# Patient Record
Sex: FEMALE | Race: WHITE | NOT HISPANIC OR LATINO | Employment: OTHER | URBAN - METROPOLITAN AREA
[De-identification: names, ages, dates, MRNs, and addresses within clinical notes are randomized per-mention and may not be internally consistent; named-entity substitution may affect disease eponyms.]

---

## 2023-04-10 ENCOUNTER — TELEPHONE (OUTPATIENT)
Dept: PRIMARY CARE | Facility: CLINIC | Age: 68
End: 2023-04-10
Payer: MEDICARE

## 2023-04-10 NOTE — TELEPHONE ENCOUNTER
Result Communication    Resulted Orders   BI mammo bilateral screening tomosynthesis    Narrative    Interpreted By:  JULITO MOORE MD  MRN: [id number]  Patient Name: JERROD PANTOJA     STUDY:  DIGITAL MAMM SCREENING W/ JOHN;  4/5/2023 9:44 am     ACCESSION NUMBER(S):  [id number]     ORDERING CLINICIAN:  KARRI SCHAEFER     INDICATION:  Screening.     COMPARISON:  04/04/2022, 03/12/2021, 06/10/2020, 03/20/2019     FINDINGS:  2D and tomosynthesis images were reviewed at 1 mm slice thickness.     There are areas of scattered fibroglandular tissue.   No suspicious  masses or calcifications are identified.       Impression    No mammographic evidence of malignancy.     BI-RADS CATEGORY:     Category: 1 - Negative.  Recommendation: 1 Year Screening.     For any future breast imaging appointments, please call [phone]).     Patient letter sent SNORM             7:00 PM      Results were successfully communicated with the patient and they acknowledged their understanding.  Left message for patient to call for results.  Normal mammogram, recheck 1 year.

## 2023-04-13 DIAGNOSIS — I25.10 ATHEROSCLEROSIS OF NATIVE CORONARY ARTERY OF NATIVE HEART WITHOUT ANGINA PECTORIS: ICD-10-CM

## 2023-04-13 DIAGNOSIS — I10 BENIGN ESSENTIAL HYPERTENSION: Primary | ICD-10-CM

## 2023-04-13 RX ORDER — ROSUVASTATIN CALCIUM 10 MG/1
TABLET, COATED ORAL
Qty: 90 TABLET | Refills: 3 | Status: SHIPPED | OUTPATIENT
Start: 2023-04-13 | End: 2023-08-15 | Stop reason: SDUPTHER

## 2023-04-13 RX ORDER — METOPROLOL SUCCINATE 25 MG/1
TABLET, EXTENDED RELEASE ORAL
Qty: 90 TABLET | Refills: 3 | Status: SHIPPED | OUTPATIENT
Start: 2023-04-13 | End: 2023-08-14 | Stop reason: SDUPTHER

## 2023-07-10 RX ORDER — CELECOXIB 400 MG/1
CAPSULE ORAL
Qty: 90 CAPSULE | Refills: 3 | OUTPATIENT
Start: 2023-07-10

## 2023-07-13 LAB — HEMOGLOBIN A1C/HEMOGLOBIN TOTAL IN BLOOD EXTERNAL: 6.5 %

## 2023-08-14 ENCOUNTER — OFFICE VISIT (OUTPATIENT)
Dept: PRIMARY CARE | Facility: CLINIC | Age: 68
End: 2023-08-14
Payer: MEDICARE

## 2023-08-14 ENCOUNTER — LAB (OUTPATIENT)
Dept: LAB | Facility: LAB | Age: 68
End: 2023-08-14
Payer: MEDICARE

## 2023-08-14 VITALS
TEMPERATURE: 97.9 F | HEIGHT: 63 IN | SYSTOLIC BLOOD PRESSURE: 119 MMHG | WEIGHT: 146 LBS | BODY MASS INDEX: 25.87 KG/M2 | OXYGEN SATURATION: 96 % | HEART RATE: 66 BPM | DIASTOLIC BLOOD PRESSURE: 78 MMHG

## 2023-08-14 DIAGNOSIS — I10 BENIGN ESSENTIAL HYPERTENSION: Primary | ICD-10-CM

## 2023-08-14 DIAGNOSIS — E10.9 CONTROLLED DIABETES MELLITUS TYPE 1 WITHOUT COMPLICATIONS (MULTI): ICD-10-CM

## 2023-08-14 DIAGNOSIS — Z11.59 NEED FOR HEPATITIS C SCREENING TEST: ICD-10-CM

## 2023-08-14 DIAGNOSIS — E11.42 DIABETIC PERIPHERAL NEUROPATHY (MULTI): ICD-10-CM

## 2023-08-14 DIAGNOSIS — M48.02 SPINAL STENOSIS OF CERVICAL REGION: ICD-10-CM

## 2023-08-14 DIAGNOSIS — I25.10 ATHEROSCLEROSIS OF NATIVE CORONARY ARTERY OF NATIVE HEART WITHOUT ANGINA PECTORIS: ICD-10-CM

## 2023-08-14 PROBLEM — G47.00 INSOMNIA: Status: ACTIVE | Noted: 2023-08-14

## 2023-08-14 PROBLEM — L65.9 HAIR LOSS: Status: ACTIVE | Noted: 2023-08-14

## 2023-08-14 PROBLEM — R42 VERTIGO: Status: ACTIVE | Noted: 2023-08-14

## 2023-08-14 PROBLEM — H65.90 SEROUS OTITIS MEDIA: Status: ACTIVE | Noted: 2023-08-14

## 2023-08-14 PROBLEM — H90.3 BILATERAL HIGH FREQUENCY SENSORINEURAL HEARING LOSS: Status: ACTIVE | Noted: 2023-08-14

## 2023-08-14 PROBLEM — E06.3 HASHIMOTO'S THYROIDITIS: Status: ACTIVE | Noted: 2023-08-14

## 2023-08-14 PROBLEM — E55.9 VITAMIN D DEFICIENCY: Status: ACTIVE | Noted: 2023-08-14

## 2023-08-14 LAB
ALANINE AMINOTRANSFERASE (SGPT) (U/L) IN SER/PLAS: 11 U/L (ref 7–45)
ALBUMIN (G/DL) IN SER/PLAS: 4.3 G/DL (ref 3.4–5)
ALKALINE PHOSPHATASE (U/L) IN SER/PLAS: 73 U/L (ref 33–136)
ANION GAP IN SER/PLAS: 11 MMOL/L (ref 10–20)
ASPARTATE AMINOTRANSFERASE (SGOT) (U/L) IN SER/PLAS: 16 U/L (ref 9–39)
BILIRUBIN TOTAL (MG/DL) IN SER/PLAS: 0.7 MG/DL (ref 0–1.2)
CALCIUM (MG/DL) IN SER/PLAS: 9.4 MG/DL (ref 8.6–10.6)
CARBON DIOXIDE, TOTAL (MMOL/L) IN SER/PLAS: 30 MMOL/L (ref 21–32)
CHLORIDE (MMOL/L) IN SER/PLAS: 108 MMOL/L (ref 98–107)
CHOLESTEROL (MG/DL) IN SER/PLAS: 141 MG/DL (ref 0–199)
CHOLESTEROL IN HDL (MG/DL) IN SER/PLAS: 77.8 MG/DL
CHOLESTEROL/HDL RATIO: 1.8
COBALAMIN (VITAMIN B12) (PG/ML) IN SER/PLAS: 710 PG/ML (ref 211–911)
CREATININE (MG/DL) IN SER/PLAS: 0.6 MG/DL (ref 0.5–1.05)
GFR FEMALE: >90 ML/MIN/1.73M2
GLUCOSE (MG/DL) IN SER/PLAS: 111 MG/DL (ref 74–99)
HEPATITIS C VIRUS AB PRESENCE IN SERUM: NONREACTIVE
LDL: 52 MG/DL (ref 0–99)
POTASSIUM (MMOL/L) IN SER/PLAS: 4.6 MMOL/L (ref 3.5–5.3)
PROTEIN TOTAL: 6.9 G/DL (ref 6.4–8.2)
SODIUM (MMOL/L) IN SER/PLAS: 144 MMOL/L (ref 136–145)
TRIGLYCERIDE (MG/DL) IN SER/PLAS: 55 MG/DL (ref 0–149)
UREA NITROGEN (MG/DL) IN SER/PLAS: 19 MG/DL (ref 6–23)
VLDL: 11 MG/DL (ref 0–40)

## 2023-08-14 PROCEDURE — 80061 LIPID PANEL: CPT

## 2023-08-14 PROCEDURE — 86803 HEPATITIS C AB TEST: CPT

## 2023-08-14 PROCEDURE — 3044F HG A1C LEVEL LT 7.0%: CPT | Performed by: FAMILY MEDICINE

## 2023-08-14 PROCEDURE — 99214 OFFICE O/P EST MOD 30 MIN: CPT | Performed by: FAMILY MEDICINE

## 2023-08-14 PROCEDURE — 82607 VITAMIN B-12: CPT

## 2023-08-14 PROCEDURE — 80053 COMPREHEN METABOLIC PANEL: CPT

## 2023-08-14 PROCEDURE — 3074F SYST BP LT 130 MM HG: CPT | Performed by: FAMILY MEDICINE

## 2023-08-14 PROCEDURE — 1036F TOBACCO NON-USER: CPT | Performed by: FAMILY MEDICINE

## 2023-08-14 PROCEDURE — 3078F DIAST BP <80 MM HG: CPT | Performed by: FAMILY MEDICINE

## 2023-08-14 PROCEDURE — 36415 COLL VENOUS BLD VENIPUNCTURE: CPT

## 2023-08-14 PROCEDURE — 1160F RVW MEDS BY RX/DR IN RCRD: CPT | Performed by: FAMILY MEDICINE

## 2023-08-14 PROCEDURE — 1159F MED LIST DOCD IN RCRD: CPT | Performed by: FAMILY MEDICINE

## 2023-08-14 RX ORDER — ACETAMINOPHEN 500 MG
1 TABLET ORAL DAILY
COMMUNITY
Start: 2018-03-12

## 2023-08-14 RX ORDER — INSULIN LISPRO 100 [IU]/ML
INJECTION, SOLUTION INTRAVENOUS; SUBCUTANEOUS
COMMUNITY
Start: 2022-08-23

## 2023-08-14 RX ORDER — CALCIUM LACTATE 100 MG
TABLET ORAL
COMMUNITY
End: 2024-01-30 | Stop reason: WASHOUT

## 2023-08-14 RX ORDER — BLOOD SUGAR DIAGNOSTIC
STRIP MISCELLANEOUS
COMMUNITY
Start: 2021-03-08

## 2023-08-14 RX ORDER — CETIRIZINE HYDROCHLORIDE 10 MG/1
1 TABLET ORAL NIGHTLY
COMMUNITY
Start: 2016-08-25

## 2023-08-14 RX ORDER — TRIAMCINOLONE ACETONIDE 55 UG/1
SPRAY, METERED NASAL
COMMUNITY
Start: 2021-03-25

## 2023-08-14 RX ORDER — METOPROLOL SUCCINATE 25 MG/1
25 TABLET, EXTENDED RELEASE ORAL DAILY
Qty: 90 TABLET | Refills: 1 | Status: SHIPPED | OUTPATIENT
Start: 2023-08-14 | End: 2024-02-08 | Stop reason: SDUPTHER

## 2023-08-14 RX ORDER — CELECOXIB 400 MG/1
1 CAPSULE ORAL DAILY
COMMUNITY
Start: 2018-04-24 | End: 2023-08-14 | Stop reason: SDUPTHER

## 2023-08-14 RX ORDER — ASCORBIC ACID 250 MG
TABLET ORAL
COMMUNITY

## 2023-08-14 RX ORDER — LEVOTHYROXINE SODIUM 88 UG/1
88 TABLET ORAL DAILY
COMMUNITY
Start: 2023-07-26

## 2023-08-14 RX ORDER — VITS A,C,E/LUTEIN/MINERALS 300MCG-200
1 TABLET ORAL DAILY
COMMUNITY

## 2023-08-14 RX ORDER — CELECOXIB 400 MG/1
400 CAPSULE ORAL DAILY
Qty: 90 CAPSULE | Refills: 1 | Status: SHIPPED | OUTPATIENT
Start: 2023-08-14 | End: 2024-02-08 | Stop reason: SDUPTHER

## 2023-08-14 ASSESSMENT — PATIENT HEALTH QUESTIONNAIRE - PHQ9
SUM OF ALL RESPONSES TO PHQ9 QUESTIONS 1 AND 2: 0
1. LITTLE INTEREST OR PLEASURE IN DOING THINGS: NOT AT ALL
2. FEELING DOWN, DEPRESSED OR HOPELESS: NOT AT ALL

## 2023-08-14 ASSESSMENT — ENCOUNTER SYMPTOMS
PALPITATIONS: 0
SHORTNESS OF BREATH: 0
COUGH: 0
UNEXPECTED WEIGHT CHANGE: 0
ABDOMINAL PAIN: 0
NECK PAIN: 1
FATIGUE: 0

## 2023-08-14 NOTE — PROGRESS NOTES
"Subjective   Patient ID: Lesia Ascencio is a 67 y.o. female who presents for Diabetes (Follow up ).    Lesia is here to follow up on her blood pressure and cholesterol.  She is seeing endocrinology who is managing her thyroid and diabetes.  She is doing very well.  She needs a refill on her celecoxib which helps her neck a lot.        Review of Systems   Constitutional:  Negative for fatigue and unexpected weight change.   Respiratory:  Negative for cough and shortness of breath.    Cardiovascular:  Negative for chest pain and palpitations.   Gastrointestinal:  Negative for abdominal pain.   Musculoskeletal:  Positive for neck pain.       Objective     /78   Pulse 66   Temp 36.6 °C (97.9 °F)   Ht 1.6 m (5' 3\")   Wt 66.2 kg (146 lb)   SpO2 96%   BMI 25.86 kg/m²     Physical Exam  Constitutional:       General: She is not in acute distress.  Neck:      Thyroid: No thyromegaly.   Cardiovascular:      Rate and Rhythm: Normal rate and regular rhythm.      Heart sounds: No murmur heard.  Pulmonary:      Effort: No respiratory distress.      Breath sounds: Normal breath sounds.   Lymphadenopathy:      Cervical: No cervical adenopathy.   Neurological:      Mental Status: She is alert.             Assessment/Plan   Problem List Items Addressed This Visit       Coronary atherosclerosis    Relevant Medications    metoprolol succinate XL (Toprol-XL) 25 mg 24 hr tablet    Other Relevant Orders    Comprehensive Metabolic Panel    Lipid Panel    Diabetes type 1, controlled (CMS/HCC)    Relevant Orders    Vitamin B12    Diabetic peripheral neuropathy (CMS/HCC)    Spinal stenosis of cervical region    Relevant Medications    celecoxib (CeleBREX) 400 mg capsule     Other Visit Diagnoses       Benign essential hypertension    -  Primary    Relevant Medications    metoprolol succinate XL (Toprol-XL) 25 mg 24 hr tablet    Need for hepatitis C screening test        Relevant Orders    Hepatitis C Antibody                "

## 2023-08-15 DIAGNOSIS — I25.10 ATHEROSCLEROSIS OF NATIVE CORONARY ARTERY OF NATIVE HEART WITHOUT ANGINA PECTORIS: ICD-10-CM

## 2023-08-15 RX ORDER — ROSUVASTATIN CALCIUM 10 MG/1
10 TABLET, COATED ORAL DAILY
Qty: 90 TABLET | Refills: 1 | Status: SHIPPED | OUTPATIENT
Start: 2023-08-15 | End: 2024-02-08 | Stop reason: SDUPTHER

## 2023-11-30 DIAGNOSIS — U07.1 COVID-19: Primary | ICD-10-CM

## 2023-11-30 RX ORDER — NIRMATRELVIR AND RITONAVIR 300-100 MG
3 KIT ORAL 2 TIMES DAILY
Qty: 30 TABLET | Refills: 0 | Status: SHIPPED | OUTPATIENT
Start: 2023-11-30 | End: 2023-12-05

## 2023-12-18 DIAGNOSIS — M48.02 SPINAL STENOSIS OF CERVICAL REGION: ICD-10-CM

## 2023-12-18 RX ORDER — CELECOXIB 400 MG/1
400 CAPSULE ORAL DAILY
Qty: 90 CAPSULE | Refills: 3 | OUTPATIENT
Start: 2023-12-18

## 2024-01-23 LAB — HEMOGLOBIN A1C/HEMOGLOBIN TOTAL IN BLOOD EXTERNAL: 6.5 %

## 2024-01-30 ENCOUNTER — OFFICE VISIT (OUTPATIENT)
Dept: PRIMARY CARE | Facility: CLINIC | Age: 69
End: 2024-01-30
Payer: MEDICARE

## 2024-01-30 VITALS
HEART RATE: 74 BPM | HEIGHT: 63 IN | TEMPERATURE: 98 F | SYSTOLIC BLOOD PRESSURE: 153 MMHG | WEIGHT: 150 LBS | BODY MASS INDEX: 26.58 KG/M2 | OXYGEN SATURATION: 99 % | DIASTOLIC BLOOD PRESSURE: 84 MMHG

## 2024-01-30 DIAGNOSIS — E10.42 CONTROLLED TYPE 1 DIABETES MELLITUS WITH DIABETIC POLYNEUROPATHY (MULTI): ICD-10-CM

## 2024-01-30 DIAGNOSIS — N39.0 URINARY TRACT INFECTION WITHOUT HEMATURIA, SITE UNSPECIFIED: Primary | ICD-10-CM

## 2024-01-30 DIAGNOSIS — I25.10 ATHEROSCLEROSIS OF NATIVE CORONARY ARTERY OF NATIVE HEART WITHOUT ANGINA PECTORIS: ICD-10-CM

## 2024-01-30 PROBLEM — E11.42 DIABETIC PERIPHERAL NEUROPATHY (MULTI): Status: RESOLVED | Noted: 2023-08-14 | Resolved: 2024-01-30

## 2024-01-30 LAB
POC APPEARANCE, URINE: CLEAR
POC BILIRUBIN, URINE: NEGATIVE
POC BLOOD, URINE: NEGATIVE
POC COLOR, URINE: YELLOW
POC GLUCOSE, URINE: NEGATIVE MG/DL
POC KETONES, URINE: NEGATIVE MG/DL
POC LEUKOCYTES, URINE: ABNORMAL
POC NITRITE,URINE: NEGATIVE
POC PH, URINE: 7 PH
POC PROTEIN, URINE: NEGATIVE MG/DL
POC SPECIFIC GRAVITY, URINE: 1.02
POC UROBILINOGEN, URINE: 1 EU/DL

## 2024-01-30 PROCEDURE — 87086 URINE CULTURE/COLONY COUNT: CPT

## 2024-01-30 PROCEDURE — 81003 URINALYSIS AUTO W/O SCOPE: CPT | Performed by: FAMILY MEDICINE

## 2024-01-30 PROCEDURE — 3077F SYST BP >= 140 MM HG: CPT | Performed by: FAMILY MEDICINE

## 2024-01-30 PROCEDURE — 1160F RVW MEDS BY RX/DR IN RCRD: CPT | Performed by: FAMILY MEDICINE

## 2024-01-30 PROCEDURE — 3079F DIAST BP 80-89 MM HG: CPT | Performed by: FAMILY MEDICINE

## 2024-01-30 PROCEDURE — 1159F MED LIST DOCD IN RCRD: CPT | Performed by: FAMILY MEDICINE

## 2024-01-30 PROCEDURE — 1036F TOBACCO NON-USER: CPT | Performed by: FAMILY MEDICINE

## 2024-01-30 PROCEDURE — 99214 OFFICE O/P EST MOD 30 MIN: CPT | Performed by: FAMILY MEDICINE

## 2024-01-30 PROCEDURE — 87186 SC STD MICRODIL/AGAR DIL: CPT

## 2024-01-30 RX ORDER — CIPROFLOXACIN 250 MG/1
250 TABLET, FILM COATED ORAL 2 TIMES DAILY
Qty: 10 TABLET | Refills: 0 | Status: SHIPPED | OUTPATIENT
Start: 2024-01-30 | End: 2024-02-08 | Stop reason: ALTCHOICE

## 2024-01-30 ASSESSMENT — ENCOUNTER SYMPTOMS
FATIGUE: 0
SHORTNESS OF BREATH: 0
ABDOMINAL PAIN: 0
NECK PAIN: 1
COUGH: 0
PALPITATIONS: 0
UNEXPECTED WEIGHT CHANGE: 0

## 2024-01-30 ASSESSMENT — PATIENT HEALTH QUESTIONNAIRE - PHQ9
2. FEELING DOWN, DEPRESSED OR HOPELESS: NOT AT ALL
1. LITTLE INTEREST OR PLEASURE IN DOING THINGS: NOT AT ALL
SUM OF ALL RESPONSES TO PHQ9 QUESTIONS 1 AND 2: 0

## 2024-01-30 ASSESSMENT — COLUMBIA-SUICIDE SEVERITY RATING SCALE - C-SSRS
1. IN THE PAST MONTH, HAVE YOU WISHED YOU WERE DEAD OR WISHED YOU COULD GO TO SLEEP AND NOT WAKE UP?: NO
6. HAVE YOU EVER DONE ANYTHING, STARTED TO DO ANYTHING, OR PREPARED TO DO ANYTHING TO END YOUR LIFE?: NO
2. HAVE YOU ACTUALLY HAD ANY THOUGHTS OF KILLING YOURSELF?: NO

## 2024-01-30 NOTE — PROGRESS NOTES
"Subjective   Patient ID: Lesia Ascencio is a 68 y.o. female who presents for UTI (Pt is here for UTI).    Lesia is here because she woke up 2 days ago with burning with urination.  No frequency or nocturia.  She does have lower abdominal pressure.  No fever.          Review of Systems   Constitutional:  Negative for fatigue and unexpected weight change.   Respiratory:  Negative for cough and shortness of breath.    Cardiovascular:  Negative for chest pain and palpitations.   Gastrointestinal:  Negative for abdominal pain.   Musculoskeletal:  Positive for neck pain.       Objective     /84   Pulse 74   Temp 36.7 °C (98 °F)   Ht 1.6 m (5' 3\")   Wt 68 kg (150 lb)   SpO2 99%   BMI 26.57 kg/m²     Physical Exam  Constitutional:       General: She is not in acute distress.  Neck:      Thyroid: No thyromegaly.   Cardiovascular:      Rate and Rhythm: Normal rate and regular rhythm.      Heart sounds: No murmur heard.  Pulmonary:      Effort: No respiratory distress.      Breath sounds: Normal breath sounds.   Lymphadenopathy:      Cervical: No cervical adenopathy.   Neurological:      Mental Status: She is alert.             Assessment/Plan   Problem List Items Addressed This Visit       Coronary atherosclerosis    Relevant Orders    Lipid Panel    Diabetes type 1, controlled (CMS/[facility])    Current Assessment & Plan     Plan per endocrinology.          Other Visit Diagnoses       Urinary tract infection without hematuria, site unspecified    -  Primary    Relevant Medications    ciprofloxacin (Cipro) 250 mg tablet    Other Relevant Orders    POCT UA Automated manually resulted (Completed)    Urine Culture                "

## 2024-02-02 LAB — BACTERIA UR CULT: ABNORMAL

## 2024-02-08 ENCOUNTER — LAB (OUTPATIENT)
Dept: LAB | Facility: LAB | Age: 69
End: 2024-02-08
Payer: MEDICARE

## 2024-02-08 ENCOUNTER — OFFICE VISIT (OUTPATIENT)
Dept: PRIMARY CARE | Facility: CLINIC | Age: 69
End: 2024-02-08
Payer: MEDICARE

## 2024-02-08 VITALS
SYSTOLIC BLOOD PRESSURE: 105 MMHG | HEIGHT: 63 IN | WEIGHT: 149 LBS | HEART RATE: 65 BPM | DIASTOLIC BLOOD PRESSURE: 77 MMHG | BODY MASS INDEX: 26.4 KG/M2 | OXYGEN SATURATION: 99 % | TEMPERATURE: 97.2 F

## 2024-02-08 DIAGNOSIS — E55.9 VITAMIN D DEFICIENCY: ICD-10-CM

## 2024-02-08 DIAGNOSIS — I25.10 ATHEROSCLEROSIS OF NATIVE CORONARY ARTERY OF NATIVE HEART WITHOUT ANGINA PECTORIS: ICD-10-CM

## 2024-02-08 DIAGNOSIS — I10 BENIGN ESSENTIAL HYPERTENSION: ICD-10-CM

## 2024-02-08 DIAGNOSIS — M48.02 SPINAL STENOSIS OF CERVICAL REGION: ICD-10-CM

## 2024-02-08 DIAGNOSIS — Z00.00 ROUTINE GENERAL MEDICAL EXAMINATION AT HEALTH CARE FACILITY: Primary | ICD-10-CM

## 2024-02-08 DIAGNOSIS — Z78.0 ASYMPTOMATIC MENOPAUSAL STATE: ICD-10-CM

## 2024-02-08 DIAGNOSIS — E10.42 CONTROLLED TYPE 1 DIABETES MELLITUS WITH DIABETIC POLYNEUROPATHY (MULTI): ICD-10-CM

## 2024-02-08 DIAGNOSIS — Z12.31 ENCOUNTER FOR SCREENING MAMMOGRAM FOR BREAST CANCER: ICD-10-CM

## 2024-02-08 LAB
25(OH)D3 SERPL-MCNC: 56 NG/ML (ref 30–100)
ALBUMIN SERPL BCP-MCNC: 3.9 G/DL (ref 3.4–5)
ALP SERPL-CCNC: 59 U/L (ref 33–136)
ALT SERPL W P-5'-P-CCNC: 11 U/L (ref 7–45)
ANION GAP SERPL CALC-SCNC: 11 MMOL/L (ref 10–20)
AST SERPL W P-5'-P-CCNC: 16 U/L (ref 9–39)
BILIRUB SERPL-MCNC: 0.6 MG/DL (ref 0–1.2)
BUN SERPL-MCNC: 25 MG/DL (ref 6–23)
CALCIUM SERPL-MCNC: 9.3 MG/DL (ref 8.6–10.6)
CHLORIDE SERPL-SCNC: 106 MMOL/L (ref 98–107)
CHOLEST SERPL-MCNC: 139 MG/DL (ref 0–199)
CHOLESTEROL/HDL RATIO: 1.7
CO2 SERPL-SCNC: 30 MMOL/L (ref 21–32)
CREAT SERPL-MCNC: 0.65 MG/DL (ref 0.5–1.05)
EGFRCR SERPLBLD CKD-EPI 2021: >90 ML/MIN/1.73M*2
GLUCOSE SERPL-MCNC: 122 MG/DL (ref 74–99)
HDLC SERPL-MCNC: 79.7 MG/DL
LDLC SERPL CALC-MCNC: 50 MG/DL
NON HDL CHOLESTEROL: 59 MG/DL (ref 0–149)
POTASSIUM SERPL-SCNC: 4.8 MMOL/L (ref 3.5–5.3)
PROT SERPL-MCNC: 6.6 G/DL (ref 6.4–8.2)
SODIUM SERPL-SCNC: 142 MMOL/L (ref 136–145)
TRIGL SERPL-MCNC: 49 MG/DL (ref 0–149)
VLDL: 10 MG/DL (ref 0–40)

## 2024-02-08 PROCEDURE — 3078F DIAST BP <80 MM HG: CPT | Performed by: FAMILY MEDICINE

## 2024-02-08 PROCEDURE — 80061 LIPID PANEL: CPT

## 2024-02-08 PROCEDURE — 80053 COMPREHEN METABOLIC PANEL: CPT

## 2024-02-08 PROCEDURE — 36415 COLL VENOUS BLD VENIPUNCTURE: CPT

## 2024-02-08 PROCEDURE — 1123F ACP DISCUSS/DSCN MKR DOCD: CPT | Performed by: FAMILY MEDICINE

## 2024-02-08 PROCEDURE — 82306 VITAMIN D 25 HYDROXY: CPT

## 2024-02-08 PROCEDURE — 1158F ADVNC CARE PLAN TLK DOCD: CPT | Performed by: FAMILY MEDICINE

## 2024-02-08 PROCEDURE — 1170F FXNL STATUS ASSESSED: CPT | Performed by: FAMILY MEDICINE

## 2024-02-08 PROCEDURE — 99214 OFFICE O/P EST MOD 30 MIN: CPT | Performed by: FAMILY MEDICINE

## 2024-02-08 PROCEDURE — 1036F TOBACCO NON-USER: CPT | Performed by: FAMILY MEDICINE

## 2024-02-08 PROCEDURE — 3074F SYST BP LT 130 MM HG: CPT | Performed by: FAMILY MEDICINE

## 2024-02-08 PROCEDURE — 1159F MED LIST DOCD IN RCRD: CPT | Performed by: FAMILY MEDICINE

## 2024-02-08 PROCEDURE — 1160F RVW MEDS BY RX/DR IN RCRD: CPT | Performed by: FAMILY MEDICINE

## 2024-02-08 PROCEDURE — G0439 PPPS, SUBSEQ VISIT: HCPCS | Performed by: FAMILY MEDICINE

## 2024-02-08 RX ORDER — ROSUVASTATIN CALCIUM 10 MG/1
10 TABLET, COATED ORAL DAILY
Qty: 90 TABLET | Refills: 1 | Status: SHIPPED | OUTPATIENT
Start: 2024-02-08 | End: 2024-08-06

## 2024-02-08 RX ORDER — METOPROLOL SUCCINATE 25 MG/1
25 TABLET, EXTENDED RELEASE ORAL DAILY
Qty: 90 TABLET | Refills: 1 | Status: SHIPPED | OUTPATIENT
Start: 2024-02-08 | End: 2024-08-06

## 2024-02-08 RX ORDER — CELECOXIB 400 MG/1
400 CAPSULE ORAL DAILY
Qty: 90 CAPSULE | Refills: 1 | Status: SHIPPED | OUTPATIENT
Start: 2024-02-08 | End: 2024-08-06

## 2024-02-08 ASSESSMENT — ACTIVITIES OF DAILY LIVING (ADL)
DRESSING: INDEPENDENT
TAKING_MEDICATION: INDEPENDENT
BATHING: INDEPENDENT
DOING_HOUSEWORK: INDEPENDENT
MANAGING_FINANCES: INDEPENDENT
GROCERY_SHOPPING: INDEPENDENT

## 2024-02-08 ASSESSMENT — ENCOUNTER SYMPTOMS
LOSS OF SENSATION IN FEET: 0
ARTHRALGIAS: 0
DEPRESSION: 0
PALPITATIONS: 0
DIARRHEA: 0
NAUSEA: 0
CONSTIPATION: 0
SORE THROAT: 0
HEADACHES: 0
OCCASIONAL FEELINGS OF UNSTEADINESS: 0
NUMBNESS: 0
DIZZINESS: 0
VOMITING: 0
WEAKNESS: 0
NERVOUS/ANXIOUS: 0
FREQUENCY: 0
SLEEP DISTURBANCE: 0
ABDOMINAL PAIN: 0
UNEXPECTED WEIGHT CHANGE: 0
EYE PAIN: 0
RHINORRHEA: 0
DYSURIA: 0
BACK PAIN: 0
BLOOD IN STOOL: 0
MYALGIAS: 0
FATIGUE: 0
SHORTNESS OF BREATH: 0
DYSPHORIC MOOD: 0
COUGH: 0

## 2024-02-08 ASSESSMENT — LIFESTYLE VARIABLES
SKIP TO QUESTIONS 9-10: 1
AUDIT-C TOTAL SCORE: 0
HOW OFTEN DO YOU HAVE SIX OR MORE DRINKS ON ONE OCCASION: NEVER
HOW OFTEN DO YOU HAVE A DRINK CONTAINING ALCOHOL: NEVER
HOW MANY STANDARD DRINKS CONTAINING ALCOHOL DO YOU HAVE ON A TYPICAL DAY: PATIENT DOES NOT DRINK

## 2024-02-08 ASSESSMENT — COLUMBIA-SUICIDE SEVERITY RATING SCALE - C-SSRS
2. HAVE YOU ACTUALLY HAD ANY THOUGHTS OF KILLING YOURSELF?: NO
1. IN THE PAST MONTH, HAVE YOU WISHED YOU WERE DEAD OR WISHED YOU COULD GO TO SLEEP AND NOT WAKE UP?: NO
6. HAVE YOU EVER DONE ANYTHING, STARTED TO DO ANYTHING, OR PREPARED TO DO ANYTHING TO END YOUR LIFE?: NO

## 2024-02-08 ASSESSMENT — PATIENT HEALTH QUESTIONNAIRE - PHQ9
1. LITTLE INTEREST OR PLEASURE IN DOING THINGS: NOT AT ALL
2. FEELING DOWN, DEPRESSED OR HOPELESS: NOT AT ALL
SUM OF ALL RESPONSES TO PHQ9 QUESTIONS 1 & 2: 0

## 2024-02-08 NOTE — PROGRESS NOTES
Subjective   Reason for Visit: Lesia Ascencio is an 68 y.o. female here for a Medicare Wellness visit.     Past Medical, Surgical, and Family History reviewed and updated in chart.    Reviewed all medications by prescribing practitioner or clinical pharmacist (such as prescriptions, OTCs, herbal therapies and supplements) and documented in the medical record.    Lesia is here for her Medicare Wellness visit.    Diet:  Eating healthy  Exercise:  Walking, cycling  Sleep:  Good  Stress:  Low  Smoking:  Never  EtOH:  None    Profession:  Retired     Dentist:  Sees regularly  Eye doctor:  Sees regularly    Last Pap:  N/A  History of abnormal Pap:  No  Mammogram:  2023  Colorectal cancer screening:  Colonoscopy 2021, Dr. Miner, repeat 5 years  DEXA scan:  2020  Vaccines due?  UTD    Sexually active:  No    Menopause symptoms:  Has vaginal dryness, no hot flushes or night sweats  Urinary leakage:  Occasional        Patient Care Team:  Caitlin Jay MD as PCP - General (Family Medicine)  Caitlin Jay MD as PCP - [employer] PCP  Ari Gray MD as Referring Physician  Osbaldo Romeo MD as Referring Physician (Endocrinology)     Review of Systems   Constitutional:  Negative for fatigue and unexpected weight change.   HENT:  Negative for congestion, ear pain, rhinorrhea and sore throat.    Eyes:  Negative for pain and visual disturbance.   Respiratory:  Negative for cough and shortness of breath.    Cardiovascular:  Negative for chest pain and palpitations.   Gastrointestinal:  Negative for abdominal pain, blood in stool, constipation, diarrhea, nausea and vomiting.   Genitourinary:  Negative for dysuria, frequency, vaginal bleeding and vaginal discharge.   Musculoskeletal:  Negative for arthralgias, back pain and myalgias.   Skin:  Negative for rash.   Neurological:  Negative for dizziness, weakness, numbness and headaches.   Psychiatric/Behavioral:   "Negative for dysphoric mood and sleep disturbance. The patient is not nervous/anxious.        Objective   Vitals:  /77 Comment: home reading  Pulse 65   Temp 36.2 °C (97.2 °F)   Ht 1.6 m (5' 3\")   Wt 67.6 kg (149 lb)   SpO2 99%   BMI 26.39 kg/m²       Physical Exam  Constitutional:       General: She is not in acute distress.  HENT:      Right Ear: Tympanic membrane normal.      Left Ear: Tympanic membrane normal.   Neck:      Thyroid: No thyromegaly.   Cardiovascular:      Rate and Rhythm: Normal rate and regular rhythm.      Heart sounds: No murmur heard.  Pulmonary:      Effort: No respiratory distress.      Breath sounds: Normal breath sounds.   Chest:   Breasts:     Breasts are symmetrical.      Right: No mass, nipple discharge, skin change or tenderness.      Left: No mass, nipple discharge, skin change or tenderness.   Abdominal:      General: Bowel sounds are normal. There is no distension.      Palpations: Abdomen is soft. There is no hepatomegaly or splenomegaly.      Tenderness: There is no abdominal tenderness.   Musculoskeletal:         General: No swelling or tenderness.   Lymphadenopathy:      Cervical: No cervical adenopathy.      Upper Body:      Right upper body: No axillary adenopathy.      Left upper body: No axillary adenopathy.   Skin:     General: Skin is warm and dry.      Coloration: Skin is not jaundiced.      Findings: No rash.   Neurological:      General: No focal deficit present.      Mental Status: She is alert and oriented to person, place, and time.   Psychiatric:         Mood and Affect: Mood normal.         Behavior: Behavior normal.         Assessment/Plan   Problem List Items Addressed This Visit       Coronary atherosclerosis    Relevant Medications    metoprolol succinate XL (Toprol-XL) 25 mg 24 hr tablet    Other Relevant Orders    Comprehensive Metabolic Panel    Diabetes type 1, controlled (CMS/HCC)    Vitamin D deficiency    Relevant Orders    Vitamin D " 25-Hydroxy,Total (for eval of Vitamin D levels)     Other Visit Diagnoses       Routine general medical examination at health care facility    -  Primary    Asymptomatic menopausal state        Relevant Orders    XR DEXA bone density    Encounter for screening mammogram for breast cancer        Relevant Orders    BI mammo bilateral screening tomosynthesis    Benign essential hypertension        Relevant Medications    metoprolol succinate XL (Toprol-XL) 25 mg 24 hr tablet

## 2024-02-08 NOTE — PATIENT INSTRUCTIONS
"Thank you for coming in to see me today, and congratulations on paying attention to staying healthy!    The single most important factor in staying healthy is eating a healthy diet.  Research has shown that the healthiest diet for humans is one rich in whole fresh plant foods.  This means most of what you eat should be fresh fruits and vegetables, whole grains, beans, nuts and seeds.  Adding meat, dairy foods and eggs does not make your food healthier (although it may make it taste better!) so you should work to minimize the amount of beef, chicken, pork, turkey, lamb, cheese and eggs you eat.  Fatty fish like salmon and tuna may be healthier alternatives.  If you would like more information please check out the website \"[employer],\" and the books \"The Blue Zones\" by Terrence Salinas and \"Engine 2 Diet\" by Cole Dorsey.    I don't like recommending \"exercise\" because that has unpleasant connotations of pain and being out of breath for many people.  Instead, I encourage my patients to find a way to move your body that you LOVE and would want to do even if it were bad for you!  Playing a fun sport like pickleball, doing yoga or juan ramon chi, studying martial arts, learning to dance, even chasing your kids or grandkids around the backyard are great examples of activity that makes your heart healthier.  Remember, if you don't like it, don't do it!  There are plenty of fun options, pick one and try it out!  Aim for at least 30 minutes of activity that gets your heart revved up, most days per week.    We discussed some screening tests for you today, these tests are meant to find problems before they make you sick, when they are easier to treat and less likely to impact your health.  Depending on your age and stage of life they may include blood tests, a Pap test, a mammogram, a bone density test, a colonoscopy and others.  If you have questions later about these or other recommended tests please call and ask!    There are " a number of other things you can do to improve your health.  These may include things like   Increasing the amount of water you drink every day (aim for 1 oz of water for every 2 pounds of body weight, up to about 100 oz total)  Getting 7-8 hours of sleep every night  Avoiding smoking, drinking alcohol, and using recreational drugs    Stress management is also a very important component of staying healthy.  One of the most effective stress management tools is meditation.  I recommend everyone consider proper training in meditation - it isn't just sitting with your eyes closed and breathing.  You can find a training program in your area at "[employer].org or [employer].org.    An annual physical is a great measure to keep you as healthy as you possibly can be.  Good for you for getting that done!  See you next year :-)

## 2024-04-25 ENCOUNTER — HOSPITAL ENCOUNTER (OUTPATIENT)
Dept: RADIOLOGY | Facility: CLINIC | Age: 69
Discharge: HOME | End: 2024-04-25
Payer: MEDICARE

## 2024-04-25 VITALS — HEIGHT: 62 IN | WEIGHT: 145 LBS | BODY MASS INDEX: 26.68 KG/M2

## 2024-04-25 DIAGNOSIS — Z12.31 ENCOUNTER FOR SCREENING MAMMOGRAM FOR BREAST CANCER: ICD-10-CM

## 2024-04-25 DIAGNOSIS — Z78.0 ASYMPTOMATIC MENOPAUSAL STATE: ICD-10-CM

## 2024-04-25 PROCEDURE — 77063 BREAST TOMOSYNTHESIS BI: CPT | Performed by: STUDENT IN AN ORGANIZED HEALTH CARE EDUCATION/TRAINING PROGRAM

## 2024-04-25 PROCEDURE — 77067 SCR MAMMO BI INCL CAD: CPT

## 2024-04-25 PROCEDURE — 77067 SCR MAMMO BI INCL CAD: CPT | Performed by: STUDENT IN AN ORGANIZED HEALTH CARE EDUCATION/TRAINING PROGRAM

## 2024-04-25 PROCEDURE — 77080 DXA BONE DENSITY AXIAL: CPT

## 2024-06-17 LAB — HEMOGLOBIN A1C/HEMOGLOBIN TOTAL IN BLOOD EXTERNAL: 6.4 %

## 2024-07-16 DIAGNOSIS — M48.02 SPINAL STENOSIS OF CERVICAL REGION: ICD-10-CM

## 2024-07-16 RX ORDER — CELECOXIB 400 MG/1
400 CAPSULE ORAL DAILY
Qty: 90 CAPSULE | Refills: 1 | Status: SHIPPED | OUTPATIENT
Start: 2024-07-16

## 2024-08-01 ENCOUNTER — APPOINTMENT (OUTPATIENT)
Dept: PRIMARY CARE | Facility: CLINIC | Age: 69
End: 2024-08-01
Payer: MEDICARE

## 2024-08-09 DIAGNOSIS — I10 BENIGN ESSENTIAL HYPERTENSION: ICD-10-CM

## 2024-08-09 DIAGNOSIS — I25.10 ATHEROSCLEROSIS OF NATIVE CORONARY ARTERY OF NATIVE HEART WITHOUT ANGINA PECTORIS: ICD-10-CM

## 2024-08-11 RX ORDER — METOPROLOL SUCCINATE 25 MG/1
25 TABLET, EXTENDED RELEASE ORAL DAILY
Qty: 90 TABLET | Refills: 3 | OUTPATIENT
Start: 2024-08-11

## 2024-08-11 RX ORDER — BLOOD SUGAR DIAGNOSTIC
STRIP MISCELLANEOUS
Qty: 300 STRIP | Refills: 3 | OUTPATIENT
Start: 2024-08-11

## 2024-08-11 RX ORDER — ROSUVASTATIN CALCIUM 10 MG/1
10 TABLET, COATED ORAL DAILY
Qty: 90 TABLET | Refills: 3 | OUTPATIENT
Start: 2024-08-11

## 2024-08-13 ENCOUNTER — LAB (OUTPATIENT)
Dept: LAB | Facility: LAB | Age: 69
End: 2024-08-13
Payer: MEDICARE

## 2024-08-13 ENCOUNTER — APPOINTMENT (OUTPATIENT)
Dept: PRIMARY CARE | Facility: CLINIC | Age: 69
End: 2024-08-13
Payer: MEDICARE

## 2024-08-13 VITALS
HEART RATE: 66 BPM | DIASTOLIC BLOOD PRESSURE: 75 MMHG | WEIGHT: 146.4 LBS | SYSTOLIC BLOOD PRESSURE: 169 MMHG | OXYGEN SATURATION: 98 % | HEIGHT: 62 IN | TEMPERATURE: 97.6 F | BODY MASS INDEX: 26.94 KG/M2

## 2024-08-13 DIAGNOSIS — I25.10 ATHEROSCLEROSIS OF NATIVE CORONARY ARTERY OF NATIVE HEART WITHOUT ANGINA PECTORIS: ICD-10-CM

## 2024-08-13 DIAGNOSIS — I10 BENIGN ESSENTIAL HYPERTENSION: ICD-10-CM

## 2024-08-13 DIAGNOSIS — M54.50 ACUTE LEFT-SIDED LOW BACK PAIN WITHOUT SCIATICA: Primary | ICD-10-CM

## 2024-08-13 LAB
ALBUMIN SERPL BCP-MCNC: 4.3 G/DL (ref 3.4–5)
ALP SERPL-CCNC: 80 U/L (ref 33–136)
ALT SERPL W P-5'-P-CCNC: 11 U/L (ref 7–45)
ANION GAP SERPL CALC-SCNC: 13 MMOL/L (ref 10–20)
AST SERPL W P-5'-P-CCNC: 16 U/L (ref 9–39)
BILIRUB SERPL-MCNC: 0.6 MG/DL (ref 0–1.2)
BUN SERPL-MCNC: 20 MG/DL (ref 6–23)
CALCIUM SERPL-MCNC: 9.3 MG/DL (ref 8.6–10.6)
CHLORIDE SERPL-SCNC: 106 MMOL/L (ref 98–107)
CHOLEST SERPL-MCNC: 141 MG/DL (ref 0–199)
CHOLESTEROL/HDL RATIO: 1.9
CO2 SERPL-SCNC: 29 MMOL/L (ref 21–32)
CREAT SERPL-MCNC: 0.62 MG/DL (ref 0.5–1.05)
EGFRCR SERPLBLD CKD-EPI 2021: >90 ML/MIN/1.73M*2
GLUCOSE SERPL-MCNC: 120 MG/DL (ref 74–99)
HDLC SERPL-MCNC: 73.8 MG/DL
LDLC SERPL CALC-MCNC: 56 MG/DL
NON HDL CHOLESTEROL: 67 MG/DL (ref 0–149)
POTASSIUM SERPL-SCNC: 5.1 MMOL/L (ref 3.5–5.3)
PROT SERPL-MCNC: 6.7 G/DL (ref 6.4–8.2)
SODIUM SERPL-SCNC: 143 MMOL/L (ref 136–145)
TRIGL SERPL-MCNC: 58 MG/DL (ref 0–149)
VLDL: 12 MG/DL (ref 0–40)

## 2024-08-13 PROCEDURE — 3044F HG A1C LEVEL LT 7.0%: CPT | Performed by: FAMILY MEDICINE

## 2024-08-13 PROCEDURE — 83695 ASSAY OF LIPOPROTEIN(A): CPT

## 2024-08-13 PROCEDURE — 1159F MED LIST DOCD IN RCRD: CPT | Performed by: FAMILY MEDICINE

## 2024-08-13 PROCEDURE — 3078F DIAST BP <80 MM HG: CPT | Performed by: FAMILY MEDICINE

## 2024-08-13 PROCEDURE — 3008F BODY MASS INDEX DOCD: CPT | Performed by: FAMILY MEDICINE

## 2024-08-13 PROCEDURE — 99214 OFFICE O/P EST MOD 30 MIN: CPT | Performed by: FAMILY MEDICINE

## 2024-08-13 PROCEDURE — 36415 COLL VENOUS BLD VENIPUNCTURE: CPT

## 2024-08-13 PROCEDURE — 3077F SYST BP >= 140 MM HG: CPT | Performed by: FAMILY MEDICINE

## 2024-08-13 PROCEDURE — 1036F TOBACCO NON-USER: CPT | Performed by: FAMILY MEDICINE

## 2024-08-13 PROCEDURE — G2211 COMPLEX E/M VISIT ADD ON: HCPCS | Performed by: FAMILY MEDICINE

## 2024-08-13 PROCEDURE — 80053 COMPREHEN METABOLIC PANEL: CPT

## 2024-08-13 PROCEDURE — 1160F RVW MEDS BY RX/DR IN RCRD: CPT | Performed by: FAMILY MEDICINE

## 2024-08-13 PROCEDURE — 80061 LIPID PANEL: CPT

## 2024-08-13 PROCEDURE — 1158F ADVNC CARE PLAN TLK DOCD: CPT | Performed by: FAMILY MEDICINE

## 2024-08-13 PROCEDURE — 1123F ACP DISCUSS/DSCN MKR DOCD: CPT | Performed by: FAMILY MEDICINE

## 2024-08-13 PROCEDURE — 3048F LDL-C <100 MG/DL: CPT | Performed by: FAMILY MEDICINE

## 2024-08-13 RX ORDER — CYCLOBENZAPRINE HCL 5 MG
5 TABLET ORAL 3 TIMES DAILY PRN
Qty: 60 TABLET | Refills: 1 | Status: SHIPPED | OUTPATIENT
Start: 2024-08-13 | End: 2024-08-13 | Stop reason: SDUPTHER

## 2024-08-13 RX ORDER — CYCLOBENZAPRINE HCL 5 MG
5 TABLET ORAL 3 TIMES DAILY PRN
Qty: 60 TABLET | Refills: 1 | Status: SHIPPED | OUTPATIENT
Start: 2024-08-13

## 2024-08-13 RX ORDER — METOPROLOL SUCCINATE 25 MG/1
25 TABLET, EXTENDED RELEASE ORAL DAILY
Qty: 90 TABLET | Refills: 1 | Status: SHIPPED | OUTPATIENT
Start: 2024-08-13 | End: 2025-02-09

## 2024-08-13 ASSESSMENT — ENCOUNTER SYMPTOMS
FATIGUE: 0
BACK PAIN: 1
COUGH: 0
NAUSEA: 0
WEAKNESS: 0
UNEXPECTED WEIGHT CHANGE: 0
PALPITATIONS: 0
NUMBNESS: 0
ABDOMINAL PAIN: 0
NECK PAIN: 1
SHORTNESS OF BREATH: 0

## 2024-08-13 ASSESSMENT — PATIENT HEALTH QUESTIONNAIRE - PHQ9
1. LITTLE INTEREST OR PLEASURE IN DOING THINGS: NOT AT ALL
SUM OF ALL RESPONSES TO PHQ9 QUESTIONS 1 & 2: 0
2. FEELING DOWN, DEPRESSED OR HOPELESS: NOT AT ALL

## 2024-08-13 NOTE — ASSESSMENT & PLAN NOTE
She has musculoskeletal low back pain without sciatica.  Differential includes facet arthropathy and degenerative disc disease.  Will refer to PT and use a mild muscle relaxer, warned re sedation.  She should continue her celecoxib, and can use acetaminophen 500 mg, 2 tabs up to 3 times daily for pain.  F/U after 6 weeks of PT for review.

## 2024-08-13 NOTE — PROGRESS NOTES
"Subjective   Patient ID: Lesia Ascencio is a 68 y.o. female who presents for Follow-up (BP).    Lesia is here to follow up on her blood pressure and cholesterol.  She is seeing endocrinology who is managing her thyroid and diabetes.  She is doing very well.  She needs a refill on her celecoxib which helps her neck a lot.  She has not been monitoring her blood pressure at home.    She has been having trouble with low back pain.  She has pain in her left low back.  The celecoxib helps.  She tried salonpas patches which helped a little.  The pain keeps her from sleeping.  The pain started about 3 weeks ago and is worse over the last week.  The pain doesn't radiate.  Some dysuria a few days ago.  No fever.  No nausea or vomiting.  No numbness or weakness but she is afraid she will fall.  Walking helps some.  Sitting hurts, standing is better.  Worse with prolonged static posture.          Review of Systems   Constitutional:  Negative for fatigue and unexpected weight change.   Respiratory:  Negative for cough and shortness of breath.    Cardiovascular:  Negative for chest pain and palpitations.   Gastrointestinal:  Negative for abdominal pain and nausea.   Musculoskeletal:  Positive for back pain and neck pain.   Neurological:  Negative for weakness and numbness.       Objective     /75   Pulse 66   Temp 36.4 °C (97.6 °F)   Ht 1.575 m (5' 2\")   Wt 66.4 kg (146 lb 6.4 oz)   SpO2 98%   BMI 26.78 kg/m²     Physical Exam  Constitutional:       General: She is not in acute distress.  Neck:      Thyroid: No thyromegaly.   Cardiovascular:      Rate and Rhythm: Normal rate and regular rhythm.      Heart sounds: No murmur heard.  Pulmonary:      Effort: No respiratory distress.      Breath sounds: Normal breath sounds.   Musculoskeletal:      Comments: Lumbar spine is nontender to palpation or percussion.  No CVA tenderness.  Moderate spasm.  There is pain with both flexion and extension, flexion is worse.  SLR " negative.     Lymphadenopathy:      Cervical: No cervical adenopathy.   Neurological:      Mental Status: She is alert.      Gait: Gait is intact.      Deep Tendon Reflexes: Reflexes are normal and symmetric.             Assessment/Plan   Problem List Items Addressed This Visit       Coronary atherosclerosis    Relevant Medications    metoprolol succinate XL (Toprol-XL) 25 mg 24 hr tablet    Other Relevant Orders    Apolipoprotein B    Comprehensive Metabolic Panel    Lipid Panel    Acute left-sided low back pain without sciatica - Primary    Current Assessment & Plan     She has musculoskeletal low back pain without sciatica.  Differential includes facet arthropathy and degenerative disc disease.  Will refer to PT and use a mild muscle relaxer, warned re sedation.  She should continue her celecoxib, and can use acetaminophen 500 mg, 2 tabs up to 3 times daily for pain.  F/U after 6 weeks of PT for review.         Relevant Medications    cyclobenzaprine (Flexeril) 5 mg tablet    Other Relevant Orders    Referral to Physical Therapy    Benign essential hypertension    Current Assessment & Plan     BP is too high, will resume home monitoring.  Continue metoprolol and F/U as scheduled.         Relevant Medications    metoprolol succinate XL (Toprol-XL) 25 mg 24 hr tablet

## 2024-08-16 DIAGNOSIS — I25.10 ATHEROSCLEROSIS OF NATIVE CORONARY ARTERY OF NATIVE HEART WITHOUT ANGINA PECTORIS: ICD-10-CM

## 2024-08-16 LAB — APO B100 SERPL-MCNC: 56 MG/DL (ref 60–117)

## 2024-08-16 RX ORDER — ROSUVASTATIN CALCIUM 10 MG/1
10 TABLET, COATED ORAL DAILY
Qty: 90 TABLET | Refills: 1 | Status: SHIPPED | OUTPATIENT
Start: 2024-08-16 | End: 2025-02-12

## 2024-08-21 ENCOUNTER — EVALUATION (OUTPATIENT)
Dept: PHYSICAL THERAPY | Facility: CLINIC | Age: 69
End: 2024-08-21
Payer: MEDICARE

## 2024-08-21 DIAGNOSIS — M54.50 ACUTE LEFT-SIDED LOW BACK PAIN WITHOUT SCIATICA: ICD-10-CM

## 2024-08-21 PROCEDURE — 97110 THERAPEUTIC EXERCISES: CPT | Mod: GP | Performed by: PHYSICAL THERAPIST

## 2024-08-21 PROCEDURE — 97161 PT EVAL LOW COMPLEX 20 MIN: CPT | Mod: GP | Performed by: PHYSICAL THERAPIST

## 2024-08-21 ASSESSMENT — ENCOUNTER SYMPTOMS
DEPRESSION: 0
LOSS OF SENSATION IN FEET: 0
OCCASIONAL FEELINGS OF UNSTEADINESS: 0

## 2024-08-21 NOTE — PROGRESS NOTES
PHYSICAL THERAPY NOTE    Patient Name: Lesia Ascencio  MRN: [id number]  Today's Date: 8/21/2024    Time Calculation  Start Time: 0900  Stop Time: 0945  Time Calculation (min): 45 min  PT Evaluation Time Entry  PT Evaluation (Low) Time Entry: 35  PT Therapeutic Procedures Time Entry  Therapeutic Exercise Time Entry: 10                   Diagnosis:    Acute LBP w/o sciatica  (L more than R)  Referred by: Caitlin Jay MD    INSURANCE $25.00 copay   Visit 1   Visit Limits: MN   Cert Dates: No auth needed   Rechecks:    Eval PT: LK     Precautions & Pmhx Fall Risk Supervision   DM 1;  thyroid disease none none               SUBJECTIVE  HPI/DOI/KHADIJAH:    one month ago, had back pain suddenly when she got out of bed.   Went on vacation to [place] but pain worsened after they got home.   MD recommended PT to manage pain.   She takes celebrex for cervical stenosis which helps her low back.   Has good and bad days    Pain:  now 3/10 left sided back pain, can spread to both sides, occasionally feels in anterior left upper thigh;   constant  Pain aggravating factors:    first thing in the morning is worst.   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, shirts for sports camp, shredding, funerals)  Living Environment:    lives w/ ; no pets  Sleep:  mattress is 8 years;   side sleeper  Patient Goals:  stop pain      OBJECTIVE EXAM  Posture:   Lordosis:  reduced  Lateral shift:   nil  Correction of posture:   ne    Reflexes:   Knee jerk  R:  2+  L:   2+   Ankle        R:  1+  L:   2+      Myotomes/Strength (MMT in sitting)  Hip Flex (L2) R/L:   5/5  Hip Add R/L:  5/5  Hip Abd R/L:  5/5  Hip Ext R/L:   4/4  Knee Ext (L3) R/L: 5/5  Knee Flex R/L (S1):   5/5  Ankle DF (L4) R/L:  5/5  Ankle PF (S1)  R/L:   5/5  Great Toe Ext (L5) R/L:  5/5  Heel Walking (L5):  Strong  Toe Walking (S1):   Strong    Sensation Dermatomes  Upper part of hips, pubic region (L1):  wfl B  Mid-Anterior Thigh (L2):  wfl B  Medial Knee (L3):  wfl B  Medial Malleolus (L4):  wfl B  Dorsal Second Toe Web Space (L5):  wfl B  Lateral Malleolus (S1):  wfl B    Special Testing  Slump:  (R)   -  (L)  -    Lumbar ROM (Deg):   FLEXION    50 ,   EXTENSION 15    LUMBAR Repeated Test movements in standing:   Key:  produces/abolishes/increases/decreases; centralises/peripheralises; better/worse/no better/no worse; no effect; pain during movement/end range pain  Pretest Symptoms Standin/10 lbp (L) > (R)  FIS:  ne  Rep FIS:  ne  EIS:  ne  Rep EIS:   ne  Rep LSG:  ne    LUMBAR Repeated Test Motions in lying    Pretest Symptoms Lyin/10   (pain is intermittent)  ALTA:  ne  Rep ALTA:    ne  EIL(Prone lying):  pulling sensation left side  Prone on Elbows: ne  PPU:   ne  Rep PPUs:   abolished pain;  B    Rep EIS at counter top:  produced left sided back pain  Rep ALTA:  NE  (still painful)  Rep EIL:      PROVISIONAL CLASSIFICATION:   lumbar derangement (L) > (R)  BASELINES:  lbp  TREATMENT STRATEGIES:  BODY MECHANICS, POSTURAL EDUCATION, BACK CARE EDUCATION, unloaded extension  HOME EXERCISE PROGRAM    -  [id number]  Prone Extension - Shona (PPU) -  10 reps / hour while at home.  Body Mechanics:  keep back straight w/ IADLS.  Posture:  sit straight, not slumped    ASSESSMENT  Patient is a 68 year old with a diagnosis of acute LBP.     Patient presents w/ an lumbar derangement w/ unloaded extension bias only.   Also Educated patient on golfers lift w/ L leg up but will need continued practice next visit.   Perform body mechanics next visit.   Patient will require skilled care 1-2 x a week for 4-6 weeks in order to reduce her derangement and improve her overall function.    Patient tolerated treatment well without increased pain or complaints following  treatment.     Patient verbal agreed with plan of care.    PROVISIONAL CLASSIFICATION:   lumbar derangement (L) > (R)  BASELINES:  lbp  TREATMENT STRATEGIES:  BODY MECHANICS, POSTURAL EDUCATION, BACK CARE EDUCATION, unloaded extension  HOME EXERCISE PROGRAM    -  [id number]  Prone Extension - Shona (PPU) -  10 reps / hour while at home.  Body Mechanics:  keep back straight w/ IADLS.   Golfers lift w/ left leg up  Posture:  sit straight, not slumped    Next visit:   body  mechanics    Clinical Presentation:  Stable and/or uncomplicated characteristics  Level Of Complexity:  Low    Rehab Potential:  Good    Problem List:  Pain  Deficits of ROM, strength, stability  Functional deficits  Posture, Body Mechanics deficits  HEP    Justification for skilled care:   Patient will require skilled PT care 1-2 x a week or 4-6 weeks in order to assess and modify hep / clinical program in order to reach mutually agreed upon goals and achieve functional mobility / PLOF .       Response to interventions:   Patient tolerated treatment well without increased pain or complaints following treatment.     Progress toward functional goals:   Initiated goals and poc this date.    GOALS: includes patient and therapist collaboration and stated goals:  Patient to be seen 2 x a week for 6 weeks:  ST weeks:   Patient independent with home exercise program.  LT-8 weeks (unless otherwise noted below):  1.  Patient to report reduction of pain by 50-60% at minimum in order to improve ckc tolerances.  2.  Patient to demonstrate an improvement in arom to reach gait and step goals.  3.  Patient to demonstrate increase in strength by 1 mmt grade in order to reach gait and step goals.  4.  Patient able to walk on level surfaces w/ LRD or no AD (if appropriate) x 1000 feet, I or MI, with appropriate gait pattern.  5.  Patient able to ascend/descend 12 steps x 1 rep with one HR, I or MI (LRD), reciprocal pattern with appropriate technique for  safety.  6.  Patient to demonstrate improvement in balance to good on level surfaces during gait activities performed in clinic with I or MI, no AD or LRD.  7.  Patient to demonstrate improvement in functional outcome form to reach gait and step goals.     Justification for skilled care:   Patient will require skilled PT care 1-2 x a week for 6 weeks in order to assess and modify hep / clinical program in order to reach goals and achieve functional mobility / PLOF .       Education and Treatment Interventions performed this date:   TX rationale, HEP, safety education, fall prevention education as appropriate.  Pt. continues to be educated on HEP, Anatomy and function, Dx, TX findings, POC, goals of therapy, activity modification, proper posture and body mechanics. HEP continues to be reviewed with pt. for any questions, concerns, modifications needed and progressions.  All questions and concerns were addressed during tx.  Patient demonstrated verbal confirmation of understanding of education provided.          PLAN OF CARE TO INCLUDE the following possible treatment interventions:  Cryotherapy, Edema Control, Education/Instruction, Gait Training, Home Program Development, Manual therapy, Neuromuscular Re-education, Therapeutic Activity, Therapeutic Exercise,  Balance Training; manual; IDN, [place] and/or Electrical Stimulation(PRN if not contraindicated).  Frequency & Duration:  Patient is appropriate for skilled care PT 1-2 x per week for 4-6 weeks in order to reduce impairments identified in objective and assessment section and improve functional mobility tolerances to return patient to highest level of PLOF.  Plan of Care Agreement:   Patient participated in and is agreeable to the POC.

## 2024-08-26 ENCOUNTER — TREATMENT (OUTPATIENT)
Dept: PHYSICAL THERAPY | Facility: CLINIC | Age: 69
End: 2024-08-26
Payer: MEDICARE

## 2024-08-26 DIAGNOSIS — M54.50 ACUTE LEFT-SIDED LOW BACK PAIN WITHOUT SCIATICA: Primary | ICD-10-CM

## 2024-08-26 PROCEDURE — 97110 THERAPEUTIC EXERCISES: CPT | Mod: GP | Performed by: PHYSICAL THERAPIST

## 2024-08-26 NOTE — PROGRESS NOTES
PHYSICAL THERAPY NOTE    Patient Name: Lesia Ascencio  MRN: [id number]  Today's Date: 8/26/2024    Time Calculation  Start Time: 0234  Stop Time: 0315  Time Calculation (min): 41 min     PT Therapeutic Procedures Time Entry  Therapeutic Exercise Time Entry: 41                   Diagnosis:    Acute LBP w/o sciatica  (L more than R)  Referred by: Caitlin Jay MD    INSURANCE $25.00 copay   Visit 2   Visit Limits: MN   Cert Dates: No auth needed   Rechecks:    Eval PT: LK     Precautions & Pmhx Fall Risk Supervision   DM 1;  thyroid disease none none               SUBJECTIVE  HPI/DOI/KHADIJAH:    one month ago, had back pain suddenly when she got out of bed.   Went on vacation to [place] but pain worsened after they got home.   MD recommended PT to manage pain.   She takes celebrex for cervical stenosis which helps her low back.   Has good and bad days    Pain:  now 3/10 left sided back pain, can spread to both sides, occasionally feels in anterior left upper thigh;   constant  Pain aggravating factors:    first thing in the morning is worst.   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, shirts for sports camp, shredding, funerals)  Living Environment:    lives w/ ; no pets  Sleep:  mattress is 8 years;   side sleeper  Patient Goals:  stop pain      OBJECTIVE EXAM  Reflexes:   Ankle        R:  1+    Myotomes/Strength (MMT in sitting)  Hip Ext R/L:   4/4    Ther Ex  Prone press ups x 10  Golfers lift left leg up  Hip hinge at wall and refrigerator  Sit to stand w/ head up x 10  Wall mini squats  Supine TA activation 10 sec x 10  Supine TA w/ march x 20  Supine TA w/ slr x 20 each  Supine TA w/ hip abd TB blue 10 sec x 10  Supine TA w/ hip add pillow 10 sec x 10  Supine TA w/ hip fall  out x 20        ASSESSMENT  Response to interventions:   Patient tolerated treatment well without increased pain or complaints following treatment.   Performed body mechanics instruction w/ reinstruction most likely needed in future appts.   Also initiated stabs.  Check tech next visit.  Patient presents w/ an lumbar derangement w/ unloaded extension bias only.   Patient is still performing improper mech and act with iadls and playing w/ grandch., but is aware that it is an issue.  Will continue w/ poc as eval date.   Progress stabs as zay in supine, s/l, prone, seated and standing.            PROVISIONAL CLASSIFICATION:   lumbar derangement (L) > (R)  BASELINES:  lbp  TREATMENT STRATEGIES:  BODY MECHANICS, POSTURAL EDUCATION, BACK CARE EDUCATION, unloaded extension  HOME EXERCISE PROGRAM    -  [id number]  Prone Extension - Shona (PPU) -  10 reps / hour while at home.  Body Mechanics:  keep back straight w/ IADLS.   Golfers lift w/ left leg up  Posture:  sit straight, not slumped    LUMBAR STABILIZATION FOR SPINE   -  [id number]  ABDOMINAL BRACING - perform in your bed: -  Repeat 10 Repetitions, Hold 10 Seconds, Complete 1 Set, Perform 2 Times a Day  BRACE SUPINE MARCHING -  Repeat 10 Repetitions, Hold 1 Second(s), Complete 1 Set, Perform 2 Times a Day  SUPINE HIP ABDUCTION - ELASTIC BAND CLAMS - CLAMSHELL -  Repeat 10 Repetitions, Hold 10 Seconds, Complete 1 Set, Perform 2 Times a Day  HIP ADDUCTION SQUEEZE - SUPINE -  Repeat 10 Repetitions, Hold 10 Seconds, Complete 1 Set, Perform 2 Times a Day  STRAIGHT LEG RAISE - SLR -  Repeat 10 Repetitions, Hold 1 Second(s), Complete 1 Set, Perform 2 Times a Day  KNEE FALL OUT  -  Repeat 10 Repetitions, Hold 1 Second(s), Complete 1 Set, Perform 2 Times a Day  HIP ABDUCTION - SIDELYING -  Repeat 10 Repetitions, Hold 3 Seconds, Complete 1 Set, Perform 2 Times a Day  Clamshells -  Repeat 10 Repetitions, Hold 2 Seconds, Complete 1 Set, Perform 3 Times a Day         Progress toward  functional goals:  focusing on pain reduction goals and BM at this time.    GOALS: includes patient and therapist collaboration and stated goals:  Patient to be seen 2 x a week for 6 weeks:  ST weeks:   Patient independent with home exercise program.  LT-8 weeks (unless otherwise noted below):  1.  Patient to report reduction of pain by 50-60% at minimum in order to improve ckc tolerances.  2.  Patient to demonstrate an improvement in arom to reach gait and step goals.  3.  Patient to demonstrate increase in strength by 1 mmt grade in order to reach gait and step goals.  4.  Patient able to walk on level surfaces w/ LRD or no AD (if appropriate) x 1000 feet, I or MI, with appropriate gait pattern.  5.  Patient able to ascend/descend 12 steps x 1 rep with one HR, I or MI (LRD), reciprocal pattern with appropriate technique for safety.  6.  Patient to demonstrate improvement in balance to good on level surfaces during gait activities performed in clinic with I or MI, no AD or LRD.  7.  Patient to demonstrate improvement in functional outcome form to reach gait and step goals.     Justification for skilled care:   Patient will require skilled PT care 1-2 x a week for 6 weeks in order to assess and modify hep / clinical program in order to reach goals and achieve functional mobility / PLOF .       Education and Treatment Interventions performed this date:   TX rationale, HEP, safety education, fall prevention education as appropriate.  Pt. continues to be educated on HEP, Anatomy and function, Dx, TX findings, POC, goals of therapy, activity modification, proper posture and body mechanics. HEP continues to be reviewed with pt. for any questions, concerns, modifications needed and progressions.  All questions and concerns were addressed during tx.  Patient demonstrated verbal confirmation of understanding of education provided.

## 2024-08-28 ENCOUNTER — TREATMENT (OUTPATIENT)
Dept: PHYSICAL THERAPY | Facility: CLINIC | Age: 69
End: 2024-08-28
Payer: MEDICARE

## 2024-08-28 DIAGNOSIS — M54.50 ACUTE LEFT-SIDED LOW BACK PAIN WITHOUT SCIATICA: ICD-10-CM

## 2024-08-28 PROCEDURE — 97140 MANUAL THERAPY 1/> REGIONS: CPT | Mod: GP | Performed by: PHYSICAL THERAPIST

## 2024-08-28 PROCEDURE — 97110 THERAPEUTIC EXERCISES: CPT | Mod: GP | Performed by: PHYSICAL THERAPIST

## 2024-08-28 NOTE — PROGRESS NOTES
PHYSICAL THERAPY NOTE    Patient Name: Lesia Ascencio  MRN: [id number]  Today's Date: 8/28/2024    Time Calculation  Start Time: 0945  Stop Time: 1030  Time Calculation (min): 45 min     PT Therapeutic Procedures Time Entry  Manual Therapy Time Entry: 25  Therapeutic Exercise Time Entry: 29                   Diagnosis:    Acute LBP w/o sciatica  (L more than R)  Referred by: Caitlin Jay MD    INSURANCE $25.00 copay   Visit Visit count could not be calculated. Make sure you are using a visit which is associated with an episode.   Visit Limits: MN   Cert Dates: No auth needed   Rechecks:    Eval PT: LK     Precautions & Pmhx Fall Risk Supervision   DM 1;  thyroid disease none none               SUBJECTIVE  Pain:  now 3/10 left sided back pain, can spread to both sides, occasionally feels in anterior left upper thigh;   constant  Pain aggravating factors:    first thing in the morning is worst.   AM  Pain alleviating factors:    moving, as the day progresses  Functional Limitations:   tieing shoes on bad days  HEP:  not compliant with pain ex, did the stab ex 2 x a day though  Went shopping at [employer];     OBJECTIVE EXAM  Reflexes:   Ankle        R:  1+    Myotomes/Strength (MMT in sitting)  Hip Ext R/L:   4/4    Ther Ex  Prone press ups x 10  Golfers lift left leg up  Hip hinge at wall and refrigerator  Sit to stand w/ head up x 10  Wall mini squats  Supine TA activation 10 sec x 10  Supine TA w/ march x 20  Supine TA w/ slr x 20 each  Supine TA w/ hip abd TB blue 10 sec x 10  Supine TA w/ hip add pillow 10 sec x 10  Supine TA w/ hip fall out x 20  TB lat pull down w/ TA contraction 2 x 10 blue  TB 3 way abdominal pull downs w/ TA  x 15 blue    Manual:  soft tissue massage, myofascial release to lb, tpr to quadratus lumborum region.  Skin intact following treatment.    ASSESSMENT  Response to interventions:     Performed hep w/o pain noted during or after.   Also performed manual to low back w/ tightness noted L  quadratus region that was relieved by position and manual.   Patient tolerated treatment well without increased pain or complaints following treatment.   Performed body mechanics instruction w/ reinstruction most likely needed in future appts.   Also initiated stabs.  Check tech next visit.  Patient presents w/ an lumbar derangement w/ unloaded extension bias only.   Patient is still performing improper mech and act with iadls and playing w/ grandch., but is aware that it is an issue.  Will continue w/ poc as eval date.   Progress stabs as zay in supine, s/l, prone, seated and standing.            PROVISIONAL CLASSIFICATION:   lumbar derangement (L) > (R)  BASELINES:  lbp  TREATMENT STRATEGIES:  BODY MECHANICS, POSTURAL EDUCATION, BACK CARE EDUCATION, unloaded extension  HOME EXERCISE PROGRAM    -  [id number]  Prone Extension - Shona (PPU) -  10 reps / hour while at home.  Body Mechanics:  keep back straight w/ IADLS.   Golfers lift w/ left leg up  Posture:  sit straight, not slumped    LUMBAR STABILIZATION FOR SPINE   -  [id number]  ABDOMINAL BRACING - perform in your bed: -  Repeat 10 Repetitions, Hold 10 Seconds, Complete 1 Set, Perform 2 Times a Day  BRACE SUPINE MARCHING -  Repeat 10 Repetitions, Hold 1 Second(s), Complete 1 Set, Perform 2 Times a Day  SUPINE HIP ABDUCTION - ELASTIC BAND CLAMS - CLAMSHELL -  Repeat 10 Repetitions, Hold 10 Seconds, Complete 1 Set, Perform 2 Times a Day  HIP ADDUCTION SQUEEZE - SUPINE -  Repeat 10 Repetitions, Hold 10 Seconds, Complete 1 Set, Perform 2 Times a Day  STRAIGHT LEG RAISE - SLR -  Repeat 10 Repetitions, Hold 1 Second(s), Complete 1 Set, Perform 2 Times a Day  KNEE FALL OUT  -  Repeat 10 Repetitions, Hold 1 Second(s), Complete 1 Set, Perform 2 Times a Day  HIP ABDUCTION - SIDELYING -  Repeat 10 Repetitions, Hold 3 Seconds, Complete 1 Set, Perform 2 Times a Day  Clamshells -  Repeat 10 Repetitions, Hold 2 Seconds, Complete 1 Set, Perform 3 Times a Day     Progress toward  functional goals:  focusing on pain reduction goals and BM at this time.    GOALS: includes patient and therapist collaboration and stated goals:  Patient to be seen 2 x a week for 6 weeks:  ST weeks:   Patient independent with home exercise program.  LT-8 weeks (unless otherwise noted below):  1.  Patient to report reduction of pain by 50-60% at minimum in order to improve ckc tolerances.  2.  Patient to demonstrate an improvement in arom to reach gait and step goals.  3.  Patient to demonstrate increase in strength by 1 mmt grade in order to reach gait and step goals.  4.  Patient able to walk on level surfaces w/ LRD or no AD (if appropriate) x 1000 feet, I or MI, with appropriate gait pattern.  5.  Patient able to ascend/descend 12 steps x 1 rep with one HR, I or MI (LRD), reciprocal pattern with appropriate technique for safety.  6.  Patient to demonstrate improvement in balance to good on level surfaces during gait activities performed in clinic with I or MI, no AD or LRD.  7.  Patient to demonstrate improvement in functional outcome form to reach gait and step goals.     Justification for skilled care:   Patient will require skilled PT care 1-2 x a week for 6 weeks in order to assess and modify hep / clinical program in order to reach goals and achieve functional mobility / PLOF .       Education and Treatment Interventions performed this date:   TX rationale, HEP, safety education, fall prevention education as appropriate.  Pt. continues to be educated on HEP, Anatomy and function, Dx, TX findings, POC, goals of therapy, activity modification, proper posture and body mechanics. HEP continues to be reviewed with pt. for any questions, concerns, modifications needed and progressions.  All questions and concerns were addressed during tx.  Patient demonstrated verbal confirmation of understanding of education provided.

## 2024-09-03 ENCOUNTER — TREATMENT (OUTPATIENT)
Dept: PHYSICAL THERAPY | Facility: CLINIC | Age: 69
End: 2024-09-03
Payer: MEDICARE

## 2024-09-03 DIAGNOSIS — M54.50 ACUTE LEFT-SIDED LOW BACK PAIN WITHOUT SCIATICA: ICD-10-CM

## 2024-09-03 PROCEDURE — 97140 MANUAL THERAPY 1/> REGIONS: CPT | Mod: GP | Performed by: PHYSICAL THERAPIST

## 2024-09-03 PROCEDURE — 97110 THERAPEUTIC EXERCISES: CPT | Mod: GP | Performed by: PHYSICAL THERAPIST

## 2024-09-03 NOTE — PROGRESS NOTES
PHYSICAL THERAPY NOTE    Patient Name: Lesia Ascencio  MRN: [id number]  Today's Date: 9/3/2024    Time Calculation  Start Time: 0945  Stop Time: 1030  Time Calculation (min): 45 min     PT Therapeutic Procedures Time Entry  Manual Therapy Time Entry: 15  Therapeutic Exercise Time Entry: 30                   Diagnosis:    Acute LBP w/o sciatica  (L more than R)  Referred by: Caitlin Jay MD    INSURANCE $25.00 copay   Visit Visit count could not be calculated. Make sure you are using a visit which is associated with an episode.   Visit Limits: MN   Cert Dates: No auth needed   Rechecks:    Eval PT: LK     Precautions & Pmhx Fall Risk Supervision   DM 1;  thyroid disease none none               SUBJECTIVE  Pain:  reports left side of back doesn't hurt as much as the right now.   3/10 today: aches.    Pain aggravating factors:    first thing in the morning is worst.   AM  Pain alleviating factors:    moving, as the day progresses  Functional Limitations:   tieing shoes on bad days  HEP:  not compliant with pain ex, did the stab ex 2 x a day though  Went shopping at [employer];     OBJECTIVE EXAM  Reflexes:   Ankle        R:  1+    Myotomes/Strength (MMT in sitting)  Hip Ext R/L:   4/4    Ther Ex  Nustep L5    2 x 10  Supine sktc:  30 sec x 4  Supine buttocks str: 30 sec x 4  Supine hip er str:  30 sec x 2  Supine piriformis str:  30 sec x 2  Supine adductor str:  30 sec x 1  Prone lying - manual    Not performed this date:  Prone press ups x 10  Golfers lift left leg up  Hip hinge at wall and refrigerator  Sit to stand w/ head up x 10  Wall mini squats  Supine TA activation 10 sec x 10  Supine TA w/ march x 20  Supine TA w/ slr x 20 each  Supine TA w/ hip abd TB blue 10 sec x 10  Supine TA w/ hip add pillow 10 sec x 10  Supine TA w/ hip fall out x 20  TB lat pull down w/ TA contraction 2 x 10 blue  TB 3 way abdominal pull downs w/ TA  x 15 blue    Manual:  soft tissue massage, myofascial release to lb, tpr to quadratus  lumborum region.  Skin intact following treatment.    ASSESSMENT  Response to interventions:       Added flexion bias this date due to right low back pain production since last visit.  Left not as painful today and responds best to ext.   Will have her perform flexion bias if R side back pain present, extension prone if left side back pain is present.  Check results next visit.   Patient tolerated treatment well without increased pain or complaints following treatment.     Skin intact following treatment.          PROVISIONAL CLASSIFICATION:   lumbar derangement (L) > (R)  BASELINES:  lbp  TREATMENT STRATEGIES:  BODY MECHANICS, POSTURAL EDUCATION, BACK CARE EDUCATION, unloaded extension  HOME EXERCISE PROGRAM    -  [id number]  Prone Extension - Shona (PPU) -  10 reps / hour while at home.  Body Mechanics:  keep back straight w/ IADLS.   Golfers lift w/ left leg up  Posture:  sit straight, not slumped    LUMBAR STABILIZATION FOR SPINE   -  [id number]  ABDOMINAL BRACING - perform in your bed: -  Repeat 10 Repetitions, Hold 10 Seconds, Complete 1 Set, Perform 2 Times a Day  BRACE SUPINE MARCHING -  Repeat 10 Repetitions, Hold 1 Second(s), Complete 1 Set, Perform 2 Times a Day  SUPINE HIP ABDUCTION - ELASTIC BAND CLAMS - CLAMSHELL -  Repeat 10 Repetitions, Hold 10 Seconds, Complete 1 Set, Perform 2 Times a Day  HIP ADDUCTION SQUEEZE - SUPINE -  Repeat 10 Repetitions, Hold 10 Seconds, Complete 1 Set, Perform 2 Times a Day  STRAIGHT LEG RAISE - SLR -  Repeat 10 Repetitions, Hold 1 Second(s), Complete 1 Set, Perform 2 Times a Day  KNEE FALL OUT  -  Repeat 10 Repetitions, Hold 1 Second(s), Complete 1 Set, Perform 2 Times a Day  HIP ABDUCTION - SIDELYING -  Repeat 10 Repetitions, Hold 3 Seconds, Complete 1 Set, Perform 2 Times a Day  Clamshells -  Repeat 10 Repetitions, Hold 2 Seconds, Complete 1 Set, Perform 3 Times a Day     Progress toward functional goals:  focusing on pain reduction goals and BM at this time.    GOALS:  includes patient and therapist collaboration and stated goals:  Patient to be seen 2 x a week for 6 weeks:  ST weeks:   Patient independent with home exercise program.  LT-8 weeks (unless otherwise noted below):  1.  Patient to report reduction of pain by 50-60% at minimum in order to improve ckc tolerances.  2.  Patient to demonstrate an improvement in arom to reach gait and step goals.  3.  Patient to demonstrate increase in strength by 1 mmt grade in order to reach gait and step goals.  4.  Patient able to walk on level surfaces w/ LRD or no AD (if appropriate) x 1000 feet, I or MI, with appropriate gait pattern.  5.  Patient able to ascend/descend 12 steps x 1 rep with one HR, I or MI (LRD), reciprocal pattern with appropriate technique for safety.  6.  Patient to demonstrate improvement in balance to good on level surfaces during gait activities performed in clinic with I or MI, no AD or LRD.  7.  Patient to demonstrate improvement in functional outcome form to reach gait and step goals.     Justification for skilled care:   Patient will require skilled PT care 1-2 x a week for 6 weeks in order to assess and modify hep / clinical program in order to reach goals and achieve functional mobility / PLOF .       Education and Treatment Interventions performed this date:   TX rationale, HEP, safety education, fall prevention education as appropriate.  Pt. continues to be educated on HEP, Anatomy and function, Dx, TX findings, POC, goals of therapy, activity modification, proper posture and body mechanics. HEP continues to be reviewed with pt. for any questions, concerns, modifications needed and progressions.  All questions and concerns were addressed during tx.  Patient demonstrated verbal confirmation of understanding of education provided.

## 2024-09-10 ENCOUNTER — TREATMENT (OUTPATIENT)
Dept: PHYSICAL THERAPY | Facility: CLINIC | Age: 69
End: 2024-09-10
Payer: MEDICARE

## 2024-09-10 DIAGNOSIS — M54.50 ACUTE LEFT-SIDED LOW BACK PAIN WITHOUT SCIATICA: ICD-10-CM

## 2024-09-10 PROCEDURE — 97140 MANUAL THERAPY 1/> REGIONS: CPT | Mod: GP | Performed by: PHYSICAL THERAPIST

## 2024-09-10 PROCEDURE — 97110 THERAPEUTIC EXERCISES: CPT | Mod: GP | Performed by: PHYSICAL THERAPIST

## 2024-09-10 NOTE — PROGRESS NOTES
PHYSICAL THERAPY NOTE    Patient Name: Lesia Ascencio  MRN: [id number]  Today's Date: 9/10/2024    Time Calculation  Start Time: 0900  Stop Time: 0945  Time Calculation (min): 45 min     PT Therapeutic Procedures Time Entry  Manual Therapy Time Entry: 35  Therapeutic Exercise Time Entry: 10                   Diagnosis:    Acute LBP w/o sciatica  (L more than R)  Referred by: Caitlin Jay MD    INSURANCE $25.00 copay   Visit 5   Visit Limits: MN   Cert Dates: No auth needed   Rechecks:    Eval PT: LK     Precautions & Pmhx Fall Risk Supervision   DM 1;  thyroid disease none none               SUBJECTIVE  Pain:   reports feeling better since last visit and performing stretches;  1/10 at most this am.  Able to walk everyday on treadmill or outside and doing well.     Pain aggravating factors:  stiff in the am but better  Pain alleviating factors:    moving, as the day progresses  Functional Limitations:   tieing shoes has gotton much better  HEP:  not compliant with pain ex, did the stab ex 2 x a day though      OBJECTIVE EXAM  Reflexes:   Ankle        R:  1+    Myotomes/Strength (MMT in sitting)  Hip Ext R/L:   4/4    Ther Ex  Nustep L5    2 x 10  Supine sktc:  30 sec x 4  Supine buttocks str: 30 sec x 4  Supine hip er str:  30 sec x 2  Supine piriformis str:  30 sec x 2  Supine adductor str:  30 sec x 1  Prone lying - manual    Not performed this date:  Supine TA activation 10 sec x 10  Supine TA w/ march x 20  Supine TA w/ slr x 20 each  Supine TA w/ hip abd TB blue 10 sec x 10  Supine TA w/ hip add pillow 10 sec x 10  Supine TA w/ hip fall out x 20  TB lat pull down w/ TA contraction 2 x 10 blue  TB 3 way abdominal pull downs w/ TA  x 15 blue    Manual:  soft tissue massage, myofascial release to lb, tpr to quadratus lumborum region.  Skin intact following treatment.    ASSESSMENT  Response to interventions:     Patient demonstrated good tolerance to prone activities for her acute left sided low back pain, then  when this abolished she noted right side back pain.    Given flexion bias exercises which significantly reduced right sided back pain and left sided did not re-occur.   Patient to continue w/ flexion bias exercises to stretch low back as long as left sided symptoms do not re-occur.   During manual, noted left Ql and B superior cluneal region tightness and discomfort w/ deep tissue release.    No pain post manual and treatment.   Progressing well toward all therapy goals.   See goal section for specifics regarding goal progress at this time.        EVAL PROVISIONAL CLASSIFICATION:   lumbar derangement (L) > (R),  BASELINES:  lbp  TREATMENT STRATEGIES:  BODY MECHANICS, POSTURAL EDUCATION, BACK CARE EDUCATION, unloaded extension  EVAL HOME EXERCISE PROGRAM    -  [id number] - for left sided lbp  Prone Extension - Shona (PPU) -  10 reps / hour while at home.  Body Mechanics:  keep back straight w/ IADLS.   Golfers lift w/ left leg up  LUMBAR STABILIZATION FOR SPINE   -  [id number]  ABDOMINAL BRACING - perform in your bed:   supine AB w/ marching; supine AB w/ hip abd TB;  Supine AB w/ hip adduction squeeze, supine AB w/ slr, supine AB w/ knee fall out, s/l AB hip abduction, s/l AB clamshells.   Flexion Bias Exercises  - for right sided lbp and recovery of function for left side of back  Supine sktc:  30 sec x 4  Supine buttocks str: 30 sec x 4  Supine hip er str:  30 sec x 2  Supine piriformis str:  30 sec x 2  Supine adductor str:  30 sec x 1     Progress toward functional goals: progressing well toward all therapy goals this date:    GOALS: includes patient and therapist collaboration and stated goals:   09/10/24  ST weeks:   Patient independent with home exercise program.   GOAL MET  LT-8 weeks (unless otherwise noted below):  1.  Patient to report reduction of pain by 50-60% at minimum in order to improve ckc tolerances.  GOAL MET  2.  Patient to demonstrate an improvement in arom to reach gait and step goals.   GOAL MET  3.  Patient to demonstrate increase in strength by 1 mmt grade in order to reach gait and step goals. GOAL PROGRESSING  4.  Patient able to walk on level surfaces w/ LRD or no AD (if appropriate) x 1000 feet, I or MI, with appropriate gait pattern.  GOAL MET  5.  Patient able to ascend/descend 12 steps x 1 rep with one HR, I or MI (LRD), reciprocal pattern with appropriate technique for safety.  GOAL MET  7.  Patient to demonstrate improvement in functional outcome form to achieve pain goal w/ iadls.    GOAL PROGRESSING     Justification for skilled care:   Patient will require skilled PT care 1-2 x a week for 6 weeks in order to assess and modify hep / clinical program in order to reach goals and achieve functional mobility / PLOF .       Education and Treatment Interventions performed this date:    HEP, recovery of function, safety, rehab protocol for back     PLAN:   continue recovery of function techniques for lumbar spine.

## 2024-09-16 ENCOUNTER — TREATMENT (OUTPATIENT)
Dept: PHYSICAL THERAPY | Facility: CLINIC | Age: 69
End: 2024-09-16
Payer: MEDICARE

## 2024-09-16 DIAGNOSIS — M54.50 ACUTE LEFT-SIDED LOW BACK PAIN WITHOUT SCIATICA: ICD-10-CM

## 2024-09-16 PROCEDURE — 97140 MANUAL THERAPY 1/> REGIONS: CPT | Mod: GP | Performed by: PHYSICAL THERAPIST

## 2024-09-16 PROCEDURE — 97110 THERAPEUTIC EXERCISES: CPT | Mod: GP | Performed by: PHYSICAL THERAPIST

## 2024-09-16 NOTE — PROGRESS NOTES
"PHYSICAL THERAPY NOTE    Patient Name: Lesia Ascencio  MRN: [id number]  Today's Date: 9/16/2024    Time Calculation  Start Time: 1045  Stop Time: 1130  Time Calculation (min): 45 min     PT Therapeutic Procedures Time Entry  Manual Therapy Time Entry: 20  Therapeutic Exercise Time Entry: 25                   Diagnosis:    Acute LBP w/o sciatica  (L more than R)  Referred by: Caitlin Jay MD    INSURANCE $25.00 copay   Visit 6   Visit Limits: MN   Cert Dates: No auth needed   Rechecks:    Eval PT: LK     Precautions & Pmhx Fall Risk Supervision   DM 1;  thyroid disease none none               SUBJECTIVE  Pain:   pain free, Notes she is \"back to normal\"  Functional Limitations:   able to tie shoes w/o issues.   Last couple of days no issues w/ am pain.   Notes she is going on a trip this weekend, will be driving and sleeping in a hotel  HEP:   compliant.       OBJECTIVE EXAM  Reflexes:   Ankle        R:  2+    Myotomes/Strength (MMT in sitting)  Hip Ext R/L:   4+/4+    Ther Ex  Nustep L5    2 x 10  Supine sktc:  30 sec x 4  Supine buttocks str: 30 sec x 4  Supine hip er str:  30 sec x 2  Supine piriformis str:  30 sec x 2  Supine adductor str:  30 sec x 1  Prone lying - manual  Supine TA activation 10 sec x 10  Supine TA w/ march x 20  2#  Supine TA w/ slr x 20 each  2#  S/l TA w/ abduction 2 # 2 x 10 B   S/l TA w/ clamshells 2# 2 x 10 B    Manual:  soft tissue massage, myofascial release to lb, tpr to quadratus lumborum region.  Skin intact following treatment.    ASSESSMENT  Response to interventions:     Recovery of function doing well.   Will go on a trip this weekend.    No pain post manual and treatment.   Progressing well toward all therapy goals.   See goal section for specifics regarding goal progress at this time.        EVAL PROVISIONAL CLASSIFICATION:   lumbar derangement (L) > (R),  BASELINES:  lbp  TREATMENT STRATEGIES:  BODY MECHANICS, POSTURAL EDUCATION, BACK CARE EDUCATION, unloaded extension  EVAL " HOME EXERCISE PROGRAM    -  [id number] - for left sided lbp  Prone Extension - Shona (PPU) -  10 reps / hour while at home.  Body Mechanics:  keep back straight w/ IADLS.   Golfers lift w/ left leg up  LUMBAR STABILIZATION FOR SPINE   -  [id number]  ABDOMINAL BRACING - perform in your bed:   supine AB w/ marching; supine AB w/ hip abd TB;  Supine AB w/ hip adduction squeeze, supine AB w/ slr, supine AB w/ knee fall out, s/l AB hip abduction, s/l AB clamshells.   Flexion Bias Exercises  - for right sided lbp and recovery of function for left side of back  Supine sktc:  30 sec x 4  Supine buttocks str: 30 sec x 4  Supine hip er str:  30 sec x 2  Supine piriformis str:  30 sec x 2  Supine adductor str:  30 sec x 1     Progress toward functional goals: progressing well toward all therapy goals this date:    GOALS: includes patient and therapist collaboration and stated goals:   09/10/24  ST weeks:   Patient independent with home exercise program.   GOAL MET  LT-8 weeks (unless otherwise noted below):  1.  Patient to report reduction of pain by 50-60% at minimum in order to improve ckc tolerances.  GOAL MET  2.  Patient to demonstrate an improvement in arom to reach gait and step goals.  GOAL MET  3.  Patient to demonstrate increase in strength by 1 mmt grade in order to reach gait and step goals. GOAL MET  4.  Patient able to walk on level surfaces w/ LRD or no AD (if appropriate) x 1000 feet, I or MI, with appropriate gait pattern.  GOAL MET  5.  Patient able to ascend/descend 12 steps x 1 rep with one HR, I or MI (LRD), reciprocal pattern with appropriate technique for safety.  GOAL MET  7.  Patient to demonstrate improvement in functional outcome form to achieve pain goal w/ iadls.    GOA MET     Justification for skilled care:   Patient will require skilled PT care 1-2 x a week for 6 weeks in order to assess and modify hep / clinical program in order to reach goals and achieve functional mobility / PLOF .        Education and Treatment Interventions performed this date:    HEP, recovery of function, safety, rehab protocol for back     PLAN:   continue recovery of function techniques for lumbar spine.

## 2024-09-18 ENCOUNTER — APPOINTMENT (OUTPATIENT)
Dept: PHYSICAL THERAPY | Facility: CLINIC | Age: 69
End: 2024-09-18
Payer: MEDICARE

## 2024-09-23 ENCOUNTER — APPOINTMENT (OUTPATIENT)
Dept: PHYSICAL THERAPY | Facility: CLINIC | Age: 69
End: 2024-09-23
Payer: MEDICARE

## 2024-09-30 ENCOUNTER — APPOINTMENT (OUTPATIENT)
Dept: PHYSICAL THERAPY | Facility: CLINIC | Age: 69
End: 2024-09-30
Payer: MEDICARE

## 2024-10-08 ENCOUNTER — APPOINTMENT (OUTPATIENT)
Dept: PRIMARY CARE | Facility: CLINIC | Age: 69
End: 2024-10-08
Payer: MEDICARE

## 2024-10-08 VITALS
WEIGHT: 149 LBS | TEMPERATURE: 97.3 F | OXYGEN SATURATION: 98 % | BODY MASS INDEX: 27.42 KG/M2 | HEART RATE: 67 BPM | SYSTOLIC BLOOD PRESSURE: 138 MMHG | DIASTOLIC BLOOD PRESSURE: 80 MMHG | HEIGHT: 62 IN

## 2024-10-08 DIAGNOSIS — I10 BENIGN ESSENTIAL HYPERTENSION: Primary | ICD-10-CM

## 2024-10-08 DIAGNOSIS — R39.15 URINARY URGENCY: ICD-10-CM

## 2024-10-08 PROBLEM — M54.50 ACUTE LEFT-SIDED LOW BACK PAIN WITHOUT SCIATICA: Status: RESOLVED | Noted: 2024-08-13 | Resolved: 2024-10-08

## 2024-10-08 LAB
POC APPEARANCE, URINE: ABNORMAL
POC BILIRUBIN, URINE: NEGATIVE
POC BLOOD, URINE: ABNORMAL
POC COLOR, URINE: YELLOW
POC GLUCOSE, URINE: NEGATIVE MG/DL
POC KETONES, URINE: NEGATIVE MG/DL
POC LEUKOCYTES, URINE: ABNORMAL
POC NITRITE,URINE: NEGATIVE
POC PH, URINE: 6 PH
POC PROTEIN, URINE: ABNORMAL MG/DL
POC SPECIFIC GRAVITY, URINE: >=1.03
POC UROBILINOGEN, URINE: 0.2 EU/DL

## 2024-10-08 PROCEDURE — 3048F LDL-C <100 MG/DL: CPT | Performed by: FAMILY MEDICINE

## 2024-10-08 PROCEDURE — 3044F HG A1C LEVEL LT 7.0%: CPT | Performed by: FAMILY MEDICINE

## 2024-10-08 PROCEDURE — 1159F MED LIST DOCD IN RCRD: CPT | Performed by: FAMILY MEDICINE

## 2024-10-08 PROCEDURE — 3079F DIAST BP 80-89 MM HG: CPT | Performed by: FAMILY MEDICINE

## 2024-10-08 PROCEDURE — 1158F ADVNC CARE PLAN TLK DOCD: CPT | Performed by: FAMILY MEDICINE

## 2024-10-08 PROCEDURE — 1123F ACP DISCUSS/DSCN MKR DOCD: CPT | Performed by: FAMILY MEDICINE

## 2024-10-08 PROCEDURE — 87186 SC STD MICRODIL/AGAR DIL: CPT

## 2024-10-08 PROCEDURE — 3008F BODY MASS INDEX DOCD: CPT | Performed by: FAMILY MEDICINE

## 2024-10-08 PROCEDURE — 81003 URINALYSIS AUTO W/O SCOPE: CPT | Performed by: FAMILY MEDICINE

## 2024-10-08 PROCEDURE — 87086 URINE CULTURE/COLONY COUNT: CPT

## 2024-10-08 PROCEDURE — G2211 COMPLEX E/M VISIT ADD ON: HCPCS | Performed by: FAMILY MEDICINE

## 2024-10-08 PROCEDURE — 1160F RVW MEDS BY RX/DR IN RCRD: CPT | Performed by: FAMILY MEDICINE

## 2024-10-08 PROCEDURE — 1036F TOBACCO NON-USER: CPT | Performed by: FAMILY MEDICINE

## 2024-10-08 PROCEDURE — 99214 OFFICE O/P EST MOD 30 MIN: CPT | Performed by: FAMILY MEDICINE

## 2024-10-08 PROCEDURE — 3075F SYST BP GE 130 - 139MM HG: CPT | Performed by: FAMILY MEDICINE

## 2024-10-08 RX ORDER — ACETAMINOPHEN 500 MG
1 TABLET ORAL 2 TIMES DAILY
COMMUNITY
Start: 2024-10-01

## 2024-10-08 ASSESSMENT — ENCOUNTER SYMPTOMS
ABDOMINAL PAIN: 0
FATIGUE: 0
SHORTNESS OF BREATH: 0
BACK PAIN: 0
COUGH: 0
UNEXPECTED WEIGHT CHANGE: 0
PALPITATIONS: 0

## 2024-10-08 ASSESSMENT — PATIENT HEALTH QUESTIONNAIRE - PHQ9
2. FEELING DOWN, DEPRESSED OR HOPELESS: NOT AT ALL
SUM OF ALL RESPONSES TO PHQ9 QUESTIONS 1 & 2: 0
1. LITTLE INTEREST OR PLEASURE IN DOING THINGS: NOT AT ALL

## 2024-10-08 NOTE — ASSESSMENT & PLAN NOTE
Blood pressure is well controlled with current meds.   [facility] -  PROGRESS NOTE               Author: Alex Jackson M.D. Date & Time: 3/23/2020  8:50 AM     HPI:  71 y.o. female admitted to [facility] on 1/14/20 with seizures.She was at a SNF.She had been previously hospitalized at [facility] and diaysis was initiated.  She was doing very poorly then and palliative care was discussed with the family,but they declined.She had very poor functional status and required Jimena lift to get into the dialysis chair.She was found to have  a CVA on MRI at [facility].Her seizures were not controlled and it was felt she was in status epilepticus.  She was getting HD at [facility].Last HD was on 1/13/20.She was seen by Dr Carcamo at [facility].Creatinine was 1.8 and dialysis was held. Neu[facility] [place] that the patient needed continuous EEG monitoring and [place] [facility]    DAILY NEPHROLOGY SUMMARY:  Please see note from 1/31 for prior summaries  Please see note from 2/29 for prior summaries  3/02 - No new events.No interaction.For HD today.  3/04 - No new developments.For HD today.No interaction.  3/06 - No new developments.Seen on dialysis.  3/07 - NO overnight renal events, No HD today (Sat).  Trache with T piece in place.   3/09 - NAEO, no changes  3/11 - NAEO, hypotensive and receiving IVF bolus today  3/13 - NAEO, SOD, BP low, UF off today  3/16: Seen on dialysis, no interaction  3/18: seen on dialysis, no recent changes or updates  3/20: no updated regarding GOC, seen on HD  3/23: seen on Dialysis, ethics committee recommending cessation of dialysis and initiation of comfort measures, family conference pending.    REVIEW OF SYSTEMS:    Review of Systems   Unable to perform ROS: Acuity of condition     PAST SURGICAL HISTORY:  Reviewed and unchanged since admission  SOCIAL HISTORY:  Reviewed and unchanged since admission  FAMILY HISTORY: Reviewed and unchanged since admission  CURRENT MEDICATIONS: Reviewed since admission to present  IMAGING STUDIES:  "Reviewed since admission to present  LABORATORY STUDIES: Reviewed since admission to present    PHYSICAL EXAM:  VS:  /63   Pulse 89   Temp 36.3 °C (97.4 °F) (Temporal)   Resp 16   Ht 1.651 m (5' 5\")   Wt 54.5 kg (120 lb 2.4 oz)   SpO2 100%   BMI 19.99 kg/m²    GENERAL: Ill-appearing  CV: RRR  RESP: Trach in place, coarse breath sounds bilaterally  GI: Soft  MSK: No obvious joint deformities   SKIN: No concerning rashes  NEURO: Diffuse weakness, no previous focal deficit    Physical ExamFluids:  In: 90 [Enteral:90]  Out: 800     LABS:  Recent Results (from the past 24 hour(s))   ACCU-CHEK GLUCOSE    Collection Time: 03/22/20 12:26 PM   Result Value Ref Range    Glucose - Accu-Ck 170 (H) 65 - 99 mg/dL   ACCU-CHEK GLUCOSE    Collection Time: 03/22/20  6:16 PM   Result Value Ref Range    Glucose - Accu-Ck 133 (H) 65 - 99 mg/dL   ACCU-CHEK GLUCOSE    Collection Time: 03/23/20 12:25 AM   Result Value Ref Range    Glucose - Accu-Ck 129 (H) 65 - 99 mg/dL   Comp Metabolic Panel (CMP) - Every Monday    Collection Time: 03/23/20  2:19 AM   Result Value Ref Range    Sodium 130 (L) 135 - 145 mmol/L    Potassium 4.2 3.6 - 5.5 mmol/L    Chloride 90 (L) 96 - 112 mmol/L    Co2 26 20 - 33 mmol/L    Anion Gap 14.0 7.0 - 16.0    Glucose 160 (H) 65 - 99 mg/dL    Bun 108 (HH) 8 - 22 mg/dL    Creatinine 1.82 (H) 0.50 - 1.40 mg/dL    Calcium 9.2 8.5 - 10.5 mg/dL    AST(SGOT) 33 12 - 45 U/L    ALT(SGPT) 22 2 - 50 U/L    Alkaline Phosphatase 124 (H) 30 - 99 U/L    Total Bilirubin 0.2 0.1 - 1.5 mg/dL    Albumin 2.5 (L) 3.2 - 4.9 g/dL    Total Protein 6.3 6.0 - 8.2 g/dL    Globulin 3.8 (H) 1.9 - 3.5 g/dL    A-G Ratio 0.7 g/dL   ESTIMATED GFR    Collection Time: 03/23/20  2:19 AM   Result Value Ref Range    GFR If  33 (A) >60 mL/min/1.73 m 2    GFR If Non  27 (A) >60 mL/min/1.73 m 2   ACCU-CHEK GLUCOSE    Collection Time: 03/23/20  5:37 AM   Result Value Ref Range    Glucose - Accu-Ck 195 (H) 65 - 99 " mg/dL       (click the triangle to expand results)    IMPRESSION:  # ESRD   MWF iHD as OP, but D/C from clinic due to > 30 days inpatient stay      # Status epilepticus   Now resolved, but mentally unresponsive  # S/P acute lacunar infarct              Hx of previous CVA with significant deficits.  # HTN--BP variable often with intradialytic hypotension  # DM II              Management per primary svc  # Acute Hypoxic Respiratory Failure              +Trach and T-piece  # Hx of dysphagia  # Anemia of CKD.Ferritin previously significantly elevated. CAT with HD.  # CKD-MBD. Managed at HD unit.              PTH 26.2 , Vit D 53 , Phos  2.5   # CAD  # DLD  # Gout,on Allopurinol  # Hx of PEG tube  # Hx of RALF  # Hx of UTI  # COPD  # Edema/Anasarca--improved  # Hypophosphatemia, improved   # Hyponatremia, improved   # Prognosis poor              Family expectations and goals for patient are not in line with prognosis   Comfort care recommended by multiple services  # Stage 3 decubitus ulcer  # TB rule out, + TB quant and abnormal CXR/CT, ID on board and managing  # Leukocytosis      PLAN:   -MWF iHD for now  -Very poor prognosis, recommend comfort care, ethics committee discussing recommendations of change to comfort measures with family  -Continue Epogen with HD  -Enteral feedings  -No dietary protein restrictions  -Follow labs    All prior notes form other doctors and RN staff were reviewed from admission to current day to help me make my clinical decisions    Thank you    Alex Jackson MD

## 2024-10-08 NOTE — PROGRESS NOTES
"Subjective   Patient ID: Lesia Ascencio is a 68 y.o. female who presents for Follow-up (Back Pain/BP).    Lesia is here to follow up on her blood pressure.  She has been monitoring it and it has been running 110s-130s, rare readings over 140 systolic.      She also needs follow up on her back pain.  She is feeling much better, back to normal.  PT helped a lot.    She has been having more trouble with incontinence lately, particularly first thing in the morning she will have trouble holding her bladder to get to the toilet.  Sometimes some pressure, no pain.        Review of Systems   Constitutional:  Negative for fatigue and unexpected weight change.   Respiratory:  Negative for cough and shortness of breath.    Cardiovascular:  Negative for chest pain and palpitations.   Gastrointestinal:  Negative for abdominal pain.   Musculoskeletal:  Negative for back pain.       Objective     /80   Pulse 67   Temp 36.3 °C (97.3 °F)   Ht 1.575 m (5' 2\")   Wt 67.6 kg (149 lb)   SpO2 98%   BMI 27.25 kg/m²     Physical Exam  Constitutional:       General: She is not in acute distress.  Neck:      Thyroid: No thyromegaly.   Cardiovascular:      Rate and Rhythm: Normal rate and regular rhythm.      Heart sounds: No murmur heard.  Pulmonary:      Effort: No respiratory distress.      Breath sounds: Normal breath sounds.   Lymphadenopathy:      Cervical: No cervical adenopathy.   Neurological:      Mental Status: She is alert.             Assessment/Plan   Problem List Items Addressed This Visit       Benign essential hypertension - Primary    Current Assessment & Plan     Blood pressure is well controlled with current meds.          Other Visit Diagnoses       Urinary urgency        Relevant Orders    POCT UA Automated manually resulted (Completed)    Urine Culture                    "

## 2024-10-10 DIAGNOSIS — R39.15 URINARY URGENCY: Primary | ICD-10-CM

## 2024-10-10 RX ORDER — CIPROFLOXACIN 250 MG/1
250 TABLET, FILM COATED ORAL 2 TIMES DAILY
Qty: 10 TABLET | Refills: 0 | Status: SHIPPED | OUTPATIENT
Start: 2024-10-10 | End: 2024-10-15

## 2024-10-10 RX ORDER — CIPROFLOXACIN 250 MG/1
250 TABLET, FILM COATED ORAL 2 TIMES DAILY
Qty: 10 TABLET | Refills: 0 | Status: SHIPPED | OUTPATIENT
Start: 2024-10-10 | End: 2024-10-10 | Stop reason: SDUPTHER

## 2024-10-11 LAB — BACTERIA UR CULT: ABNORMAL

## 2024-10-14 ENCOUNTER — TELEPHONE (OUTPATIENT)
Dept: PRIMARY CARE | Facility: CLINIC | Age: 69
End: 2024-10-14
Payer: MEDICARE

## 2024-10-14 NOTE — TELEPHONE ENCOUNTER
CALLED TO CANCEL PPT APPT FOR JAN 14TH 2025 AND SHE WANTED TO COME IN THE 8TH TO GET BLOOD WORK IT CAN'T BE A MWV BECAUSE SHE IS NOT  DUE UNTIL FEB HOW DO YOU WANT ME TO SCHEDULE THAT?

## 2024-12-18 ENCOUNTER — OFFICE VISIT (OUTPATIENT)
Dept: PRIMARY CARE | Facility: CLINIC | Age: 69
End: 2024-12-18
Payer: MEDICARE

## 2024-12-18 ENCOUNTER — TELEPHONE (OUTPATIENT)
Dept: PRIMARY CARE | Facility: CLINIC | Age: 69
End: 2024-12-18

## 2024-12-18 ENCOUNTER — LAB (OUTPATIENT)
Dept: LAB | Facility: LAB | Age: 69
End: 2024-12-18
Payer: MEDICARE

## 2024-12-18 VITALS
DIASTOLIC BLOOD PRESSURE: 77 MMHG | WEIGHT: 149.5 LBS | HEART RATE: 70 BPM | HEIGHT: 62 IN | OXYGEN SATURATION: 97 % | BODY MASS INDEX: 27.51 KG/M2 | SYSTOLIC BLOOD PRESSURE: 130 MMHG

## 2024-12-18 DIAGNOSIS — R19.7 DIARRHEA, UNSPECIFIED TYPE: Primary | ICD-10-CM

## 2024-12-18 DIAGNOSIS — R19.7 DIARRHEA, UNSPECIFIED TYPE: ICD-10-CM

## 2024-12-18 PROCEDURE — 1159F MED LIST DOCD IN RCRD: CPT | Performed by: FAMILY MEDICINE

## 2024-12-18 PROCEDURE — G2211 COMPLEX E/M VISIT ADD ON: HCPCS | Performed by: FAMILY MEDICINE

## 2024-12-18 PROCEDURE — 3075F SYST BP GE 130 - 139MM HG: CPT | Performed by: FAMILY MEDICINE

## 2024-12-18 PROCEDURE — 3078F DIAST BP <80 MM HG: CPT | Performed by: FAMILY MEDICINE

## 2024-12-18 PROCEDURE — 1160F RVW MEDS BY RX/DR IN RCRD: CPT | Performed by: FAMILY MEDICINE

## 2024-12-18 PROCEDURE — 1036F TOBACCO NON-USER: CPT | Performed by: FAMILY MEDICINE

## 2024-12-18 PROCEDURE — 99214 OFFICE O/P EST MOD 30 MIN: CPT | Performed by: FAMILY MEDICINE

## 2024-12-18 PROCEDURE — 3008F BODY MASS INDEX DOCD: CPT | Performed by: FAMILY MEDICINE

## 2024-12-18 PROCEDURE — 3048F LDL-C <100 MG/DL: CPT | Performed by: FAMILY MEDICINE

## 2024-12-18 PROCEDURE — 83630 LACTOFERRIN FECAL (QUAL): CPT

## 2024-12-18 PROCEDURE — 1123F ACP DISCUSS/DSCN MKR DOCD: CPT | Performed by: FAMILY MEDICINE

## 2024-12-18 PROCEDURE — 3044F HG A1C LEVEL LT 7.0%: CPT | Performed by: FAMILY MEDICINE

## 2024-12-18 PROCEDURE — 87506 IADNA-DNA/RNA PROBE TQ 6-11: CPT | Performed by: FAMILY MEDICINE

## 2024-12-18 PROCEDURE — 1158F ADVNC CARE PLAN TLK DOCD: CPT | Performed by: FAMILY MEDICINE

## 2024-12-18 RX ORDER — HYDROCORTISONE ACETATE PRAMOXINE HCL 2.5; 1 G/100G; G/100G
CREAM TOPICAL 2 TIMES DAILY
Qty: 30 G | Refills: 1 | Status: SHIPPED | OUTPATIENT
Start: 2024-12-18

## 2024-12-18 RX ORDER — HYDROCORTISONE ACETATE PRAMOXINE HCL 2.5; 1 G/100G; G/100G
CREAM TOPICAL 2 TIMES DAILY
Qty: 30 G | Refills: 1 | Status: SHIPPED | OUTPATIENT
Start: 2024-12-18 | End: 2024-12-18 | Stop reason: SDUPTHER

## 2024-12-18 ASSESSMENT — ENCOUNTER SYMPTOMS
COUGH: 0
VOMITING: 0
SHORTNESS OF BREATH: 0
UNEXPECTED WEIGHT CHANGE: 0
NAUSEA: 0
FATIGUE: 0
DIARRHEA: 1
FEVER: 0
PALPITATIONS: 0

## 2024-12-18 ASSESSMENT — PATIENT HEALTH QUESTIONNAIRE - PHQ9
SUM OF ALL RESPONSES TO PHQ9 QUESTIONS 1 & 2: 0
1. LITTLE INTEREST OR PLEASURE IN DOING THINGS: NOT AT ALL
2. FEELING DOWN, DEPRESSED OR HOPELESS: NOT AT ALL

## 2024-12-18 NOTE — PROGRESS NOTES
"Subjective   Patient ID: Lesia Ascencio is a 69 y.o. female who presents for Bowel Issues.    Lesia is here because she has been having flatulence and frequent small loose stools for the last 3 weeks.  She is having some anal pain due to frequent bowel movements.  She has tried different wipes without relief, has tried some OTC medications like Pepto and Kaopectate which really haven't helped either.  Did take nitrofurantoin shortly after Thanksgiving from a tele-doc visit for urinary symptoms.  The bowel symptoms had already started when she took the antibiotic.  No abdominal pain.  No nausea or vomiting.  No fever.  No bloody stool (it was black after taking Pepto and Kaopectate).  The urinary frequency is gone now.        Review of Systems   Constitutional:  Negative for fatigue, fever and unexpected weight change.   Respiratory:  Negative for cough and shortness of breath.    Cardiovascular:  Negative for chest pain and palpitations.   Gastrointestinal:  Positive for diarrhea. Negative for nausea and vomiting.       Objective     /77   Pulse 70   Ht 1.575 m (5' 2\")   Wt 67.8 kg (149 lb 8 oz)   SpO2 97%   BMI 27.34 kg/m²     Physical Exam  Constitutional:       General: She is not in acute distress.  Neck:      Thyroid: No thyromegaly.   Cardiovascular:      Rate and Rhythm: Normal rate and regular rhythm.      Heart sounds: No murmur heard.  Pulmonary:      Effort: No respiratory distress.      Breath sounds: Normal breath sounds.   Abdominal:      General: Bowel sounds are normal.      Palpations: Abdomen is soft.      Tenderness: There is no abdominal tenderness. There is no guarding or rebound.   Lymphadenopathy:      Cervical: No cervical adenopathy.   Neurological:      Mental Status: She is alert.             Assessment/Plan   Problem List Items Addressed This Visit    None  Visit Diagnoses       Diarrhea, unspecified type    -  Primary    There is concern for colitis vs diverticulitis vs " infectious process, the patient is a diabetic and fever and pain would not reliably be present.    Relevant Medications    hydrocortisone-pramoxine (Analpram-HC) 2.5-1 % cream    Other Relevant Orders    Lactoferrin, Fecal, Quantitative    CT abdomen pelvis wo IV contrast    Stool Pathogen Panel, PCR

## 2024-12-20 LAB — LACTOFERRIN STL QL IA: NEGATIVE

## 2024-12-27 ENCOUNTER — TELEPHONE (OUTPATIENT)
Dept: PRIMARY CARE | Facility: CLINIC | Age: 69
End: 2024-12-27
Payer: MEDICARE

## 2024-12-27 NOTE — TELEPHONE ENCOUNTER
LAB CALLED AND THE GASTROINTESTINAL PATHOGEN ORDER WAS DISCONTINUED DUE TO SAMPLE BEING TO OLD. THEY SAID YOU CAN HAVE PATIENT REDO IT BUT NEW ORDER NEEDS TO BE PLACED.

## 2024-12-30 DIAGNOSIS — M48.02 SPINAL STENOSIS OF CERVICAL REGION: ICD-10-CM

## 2024-12-30 RX ORDER — CELECOXIB 400 MG/1
400 CAPSULE ORAL DAILY
Qty: 90 CAPSULE | Refills: 3 | OUTPATIENT
Start: 2024-12-30

## 2025-01-02 ENCOUNTER — HOSPITAL ENCOUNTER (OUTPATIENT)
Dept: RADIOLOGY | Facility: CLINIC | Age: 70
Discharge: HOME | End: 2025-01-02
Payer: MEDICARE

## 2025-01-02 DIAGNOSIS — R19.7 DIARRHEA, UNSPECIFIED TYPE: ICD-10-CM

## 2025-01-02 PROCEDURE — 74176 CT ABD & PELVIS W/O CONTRAST: CPT | Performed by: RADIOLOGY

## 2025-01-02 PROCEDURE — 74176 CT ABD & PELVIS W/O CONTRAST: CPT

## 2025-01-06 DIAGNOSIS — R19.7 DIARRHEA, UNSPECIFIED TYPE: Primary | ICD-10-CM

## 2025-01-08 ENCOUNTER — APPOINTMENT (OUTPATIENT)
Dept: PRIMARY CARE | Facility: CLINIC | Age: 70
End: 2025-01-08
Payer: MEDICARE

## 2025-01-14 ENCOUNTER — APPOINTMENT (OUTPATIENT)
Dept: PRIMARY CARE | Facility: CLINIC | Age: 70
End: 2025-01-14
Payer: MEDICARE

## 2025-01-16 ENCOUNTER — APPOINTMENT (OUTPATIENT)
Dept: PRIMARY CARE | Facility: CLINIC | Age: 70
End: 2025-01-16
Payer: MEDICARE

## 2025-01-17 ENCOUNTER — LAB (OUTPATIENT)
Dept: LAB | Facility: LAB | Age: 70
End: 2025-01-17
Payer: MEDICARE

## 2025-01-17 DIAGNOSIS — R19.7 DIARRHEA, UNSPECIFIED TYPE: ICD-10-CM

## 2025-01-17 LAB — HOLD SPECIMEN: NORMAL

## 2025-01-17 PROCEDURE — 84376 SUGARS SINGLE QUAL: CPT

## 2025-01-17 PROCEDURE — 82653 EL-1 FECAL QUANTITATIVE: CPT

## 2025-01-20 LAB — REDUCING SUBS STL QL: NORMAL

## 2025-01-21 LAB — ELASTASE PANC STL-MCNT: 604 UG/G

## 2025-01-24 ENCOUNTER — APPOINTMENT (OUTPATIENT)
Dept: PRIMARY CARE | Facility: CLINIC | Age: 70
End: 2025-01-24
Payer: MEDICARE

## 2025-01-29 ENCOUNTER — APPOINTMENT (OUTPATIENT)
Dept: PRIMARY CARE | Facility: CLINIC | Age: 70
End: 2025-01-29
Payer: MEDICARE

## 2025-01-29 VITALS
WEIGHT: 150 LBS | BODY MASS INDEX: 27.6 KG/M2 | SYSTOLIC BLOOD PRESSURE: 127 MMHG | HEART RATE: 64 BPM | OXYGEN SATURATION: 98 % | DIASTOLIC BLOOD PRESSURE: 70 MMHG | RESPIRATION RATE: 16 BRPM | HEIGHT: 62 IN

## 2025-01-29 DIAGNOSIS — R19.7 DIARRHEA, UNSPECIFIED TYPE: ICD-10-CM

## 2025-01-29 DIAGNOSIS — M48.02 SPINAL STENOSIS OF CERVICAL REGION: ICD-10-CM

## 2025-01-29 DIAGNOSIS — I25.10 ATHEROSCLEROSIS OF NATIVE CORONARY ARTERY OF NATIVE HEART WITHOUT ANGINA PECTORIS: ICD-10-CM

## 2025-01-29 DIAGNOSIS — Z12.31 ENCOUNTER FOR SCREENING MAMMOGRAM FOR BREAST CANCER: ICD-10-CM

## 2025-01-29 DIAGNOSIS — E78.5 DYSLIPIDEMIA: ICD-10-CM

## 2025-01-29 DIAGNOSIS — Z71.89 GOALS OF CARE, COUNSELING/DISCUSSION: ICD-10-CM

## 2025-01-29 DIAGNOSIS — Z00.00 MEDICARE ANNUAL WELLNESS VISIT, SUBSEQUENT: Primary | ICD-10-CM

## 2025-01-29 DIAGNOSIS — R42 VERTIGO: ICD-10-CM

## 2025-01-29 LAB
ALBUMIN SERPL-MCNC: 4.1 G/DL (ref 3.6–5.1)
ALP SERPL-CCNC: 58 U/L (ref 37–153)
ALT SERPL-CCNC: 10 U/L (ref 6–29)
ANION GAP SERPL CALCULATED.4IONS-SCNC: 8 MMOL/L (CALC) (ref 7–17)
AST SERPL-CCNC: 16 U/L (ref 10–35)
BILIRUB SERPL-MCNC: 0.6 MG/DL (ref 0.2–1.2)
BUN SERPL-MCNC: 22 MG/DL (ref 7–25)
CALCIUM SERPL-MCNC: 9.2 MG/DL (ref 8.6–10.4)
CHLORIDE SERPL-SCNC: 107 MMOL/L (ref 98–110)
CHOLEST SERPL-MCNC: 137 MG/DL
CHOLEST/HDLC SERPL: 1.8 (CALC)
CO2 SERPL-SCNC: 27 MMOL/L (ref 20–32)
CREAT SERPL-MCNC: 0.65 MG/DL (ref 0.6–1)
EGFRCR SERPLBLD CKD-EPI 2021: 94 ML/MIN/1.73M2
ERYTHROCYTE [DISTWIDTH] IN BLOOD BY AUTOMATED COUNT: 13.3 % (ref 11–15)
GLUCOSE SERPL-MCNC: 96 MG/DL (ref 65–99)
HCT VFR BLD AUTO: 43.2 % (ref 35–45)
HDLC SERPL-MCNC: 77 MG/DL
HGB BLD-MCNC: 13.7 G/DL (ref 11.7–15.5)
LDLC SERPL CALC-MCNC: 47 MG/DL (CALC)
MCH RBC QN AUTO: 29.1 PG (ref 27–33)
MCHC RBC AUTO-ENTMCNC: 31.7 G/DL (ref 32–36)
MCV RBC AUTO: 91.7 FL (ref 80–100)
NONHDLC SERPL-MCNC: 60 MG/DL (CALC)
PLATELET # BLD AUTO: 209 THOUSAND/UL (ref 140–400)
PMV BLD REES-ECKER: 12.2 FL (ref 7.5–12.5)
POTASSIUM SERPL-SCNC: 5.1 MMOL/L (ref 3.5–5.3)
PROT SERPL-MCNC: 6.5 G/DL (ref 6.1–8.1)
RBC # BLD AUTO: 4.71 MILLION/UL (ref 3.8–5.1)
SODIUM SERPL-SCNC: 142 MMOL/L (ref 135–146)
TRIGL SERPL-MCNC: 52 MG/DL
WBC # BLD AUTO: 4.5 THOUSAND/UL (ref 3.8–10.8)

## 2025-01-29 PROCEDURE — 1123F ACP DISCUSS/DSCN MKR DOCD: CPT | Performed by: INTERNAL MEDICINE

## 2025-01-29 PROCEDURE — 99214 OFFICE O/P EST MOD 30 MIN: CPT | Performed by: INTERNAL MEDICINE

## 2025-01-29 PROCEDURE — G0439 PPPS, SUBSEQ VISIT: HCPCS | Performed by: INTERNAL MEDICINE

## 2025-01-29 PROCEDURE — 1158F ADVNC CARE PLAN TLK DOCD: CPT | Performed by: INTERNAL MEDICINE

## 2025-01-29 PROCEDURE — 3008F BODY MASS INDEX DOCD: CPT | Performed by: INTERNAL MEDICINE

## 2025-01-29 PROCEDURE — 1159F MED LIST DOCD IN RCRD: CPT | Performed by: INTERNAL MEDICINE

## 2025-01-29 PROCEDURE — 3074F SYST BP LT 130 MM HG: CPT | Performed by: INTERNAL MEDICINE

## 2025-01-29 PROCEDURE — 1170F FXNL STATUS ASSESSED: CPT | Performed by: INTERNAL MEDICINE

## 2025-01-29 PROCEDURE — 3078F DIAST BP <80 MM HG: CPT | Performed by: INTERNAL MEDICINE

## 2025-01-29 PROCEDURE — 1036F TOBACCO NON-USER: CPT | Performed by: INTERNAL MEDICINE

## 2025-01-29 RX ORDER — MECLIZINE HYDROCHLORIDE 25 MG/1
25 TABLET ORAL 3 TIMES DAILY PRN
Qty: 30 TABLET | Refills: 0 | Status: SHIPPED | OUTPATIENT
Start: 2025-01-29 | End: 2026-01-29

## 2025-01-29 RX ORDER — CELECOXIB 400 MG/1
400 CAPSULE ORAL DAILY
Qty: 90 CAPSULE | Refills: 3 | Status: SHIPPED | OUTPATIENT
Start: 2025-01-29

## 2025-01-29 RX ORDER — METOPROLOL SUCCINATE 25 MG/1
25 TABLET, EXTENDED RELEASE ORAL DAILY
Qty: 90 TABLET | Refills: 3 | Status: SHIPPED | OUTPATIENT
Start: 2025-01-29 | End: 2026-01-24

## 2025-01-29 RX ORDER — ONDANSETRON 4 MG/1
4 TABLET, ORALLY DISINTEGRATING ORAL EVERY 8 HOURS PRN
Qty: 20 TABLET | Refills: 0 | Status: SHIPPED | OUTPATIENT
Start: 2025-01-29 | End: 2025-02-05

## 2025-01-29 RX ORDER — HYDROCORTISONE ACETATE PRAMOXINE HCL 2.5; 1 G/100G; G/100G
CREAM TOPICAL 2 TIMES DAILY
Qty: 30 G | Refills: 3 | Status: SHIPPED | OUTPATIENT
Start: 2025-01-29

## 2025-01-29 RX ORDER — FLUTICASONE PROPIONATE 50 MCG
1 SPRAY, SUSPENSION (ML) NASAL AS NEEDED
COMMUNITY

## 2025-01-29 RX ORDER — INSULIN GLARGINE-YFGN 100 [IU]/ML
100 INJECTION, SOLUTION SUBCUTANEOUS EVERY MORNING
COMMUNITY
Start: 2025-01-06

## 2025-01-29 RX ORDER — ROSUVASTATIN CALCIUM 10 MG/1
10 TABLET, COATED ORAL DAILY
Qty: 90 TABLET | Refills: 3 | Status: SHIPPED | OUTPATIENT
Start: 2025-01-29 | End: 2026-01-24

## 2025-01-29 ASSESSMENT — ENCOUNTER SYMPTOMS
SLEEP DISTURBANCE: 0
OCCASIONAL FEELINGS OF UNSTEADINESS: 0
LOSS OF SENSATION IN FEET: 0
HEADACHES: 0
DIZZINESS: 0
DEPRESSION: 0
BLOOD IN STOOL: 0
FATIGUE: 0
SHORTNESS OF BREATH: 0

## 2025-01-29 ASSESSMENT — ACTIVITIES OF DAILY LIVING (ADL)
TAKING_MEDICATION: INDEPENDENT
DOING_HOUSEWORK: INDEPENDENT
DRESSING: INDEPENDENT
BATHING: INDEPENDENT
MANAGING_FINANCES: INDEPENDENT
GROCERY_SHOPPING: INDEPENDENT

## 2025-01-29 NOTE — ASSESSMENT & PLAN NOTE
-Pt states this can come on suddenly. Gets relief from meclizine and zofran. Refilled so she has available as needed.  Orders:    ondansetron ODT (Zofran-ODT) 4 mg disintegrating tablet; Dissolve 1 tablet (4 mg) in the mouth every 8 hours if needed for nausea or vomiting for up to 7 days.    meclizine (Antivert) 25 mg tablet; Take 1 tablet (25 mg) by mouth 3 times a day as needed for dizziness.

## 2025-01-29 NOTE — ASSESSMENT & PLAN NOTE
-Refilling celebrex as it has helped with pain.  Orders:    celecoxib (CeleBREX) 400 mg capsule; Take 1 capsule (400 mg) by mouth once daily.

## 2025-01-29 NOTE — ASSESSMENT & PLAN NOTE
-Meds refilled.  Orders:    rosuvastatin (Crestor) 10 mg tablet; Take 1 tablet (10 mg) by mouth once daily.    metoprolol succinate XL (Toprol-XL) 25 mg 24 hr tablet; Take 1 tablet (25 mg) by mouth once daily. Do not crush or chew.

## 2025-01-29 NOTE — PROGRESS NOTES
"Subjective   Reason for Visit: Lesia Ascencio is an 69 y.o. female here for a Medicare Wellness visit.     Past Medical, Surgical, and Family History reviewed and updated in chart.    Reviewed all medications by prescribing practitioner or clinical pharmacist (such as prescriptions, OTCs, herbal therapies and supplements) and documented in the medical record.    Pt presents to get established from Dr. Jay's office and for a MAW.    PMH:  -CAD: On BB and statin.  -DM1: Had gestational diabetes. Classified as this despite being diagnosed in her 30s. Has an insulin pump. [place] Rome[place] [facility] [place] Last A1c was 6.5%.  -Hypothyroidism: Takes levothyroxine. Managed by Dr. Romeo.  -Cervical stenosis: Had an MRI that showed this. Takes celebrex.    Tries to exercise daily with either a treadmill, a bike, or walking outside.    2 months ago started to have frequent bowel movements. When going not a lot would come out and would appear 'gooey' and require a lot of wipe. Is going 4-5x a day. Started a probiotic recently which seemed to help a little before then stopping.        Patient Care Team:  Haja Haynes MD as PCP - General (Internal Medicine)  Ari Gray MD as Referring Physician  Osbaldo Romeo MD as Referring Physician (Endocrinology)     Pt is not considered to be at high risk of opioid abuse after reviewing chart.    Review of Systems   Constitutional:  Negative for fatigue.   Respiratory:  Negative for shortness of breath.    Cardiovascular:  Negative for chest pain.   Gastrointestinal:  Negative for blood in stool.   Neurological:  Negative for dizziness and headaches.   Psychiatric/Behavioral:  Negative for sleep disturbance.        Objective   Vitals:  /70 (BP Location: Right arm, Patient Position: Sitting)   Pulse 64   Resp 16   Ht 1.562 m (5' 1.5\")   Wt 68 kg (150 lb)   SpO2 98%   BMI 27.88 kg/m²       Physical Exam  Constitutional:       General: She is not in acute distress.     " Appearance: She is not ill-appearing, toxic-appearing or diaphoretic.   HENT:      Head: Normocephalic and atraumatic.   Eyes:      Conjunctiva/sclera: Conjunctivae normal.   Cardiovascular:      Rate and Rhythm: Normal rate and regular rhythm.      Heart sounds: No murmur heard.     No friction rub. No gallop.   Pulmonary:      Effort: Pulmonary effort is normal. No respiratory distress.      Breath sounds: No stridor. No wheezing, rhonchi or rales.   Abdominal:      General: Abdomen is flat. Bowel sounds are normal. There is no distension.      Palpations: Abdomen is soft.      Tenderness: There is no abdominal tenderness. There is no guarding.   Musculoskeletal:      Cervical back: Normal range of motion. No rigidity or tenderness.   Lymphadenopathy:      Cervical: No cervical adenopathy.   Skin:     General: Skin is warm and dry.   Neurological:      Mental Status: She is alert.         Assessment/Plan   Assessment & Plan  Medicare annual wellness visit, subsequent         Goals of care, counseling/discussion         Dyslipidemia  -Stable on crestor. Will repeat labwork and refill today.  Orders:    Comprehensive metabolic panel; Future    CBC; Future    Lipid panel; Future    Encounter for screening mammogram for breast cancer  -Due later this year. Order placed.  Orders:    BI mammo bilateral screening tomosynthesis; Future    Spinal stenosis of cervical region  -Refilling celebrex as it has helped with pain.  Orders:    celecoxib (CeleBREX) 400 mg capsule; Take 1 capsule (400 mg) by mouth once daily.    Atherosclerosis of native coronary artery of native heart without angina pectoris  -Meds refilled.  Orders:    rosuvastatin (Crestor) 10 mg tablet; Take 1 tablet (10 mg) by mouth once daily.    metoprolol succinate XL (Toprol-XL) 25 mg 24 hr tablet; Take 1 tablet (25 mg) by mouth once daily. Do not crush or chew.    Diarrhea, unspecified type  -Reviewed hx w/ pt. Reviewed labwork and CT results with her. She  has continued to have symptoms of frequent loose stools 4-5x a day. Advised to try metamucil to see if this can help. If not, may need GI eval. Pt agreeable with plan. To let us know if she needs referral.  Orders:    hydrocortisone-pramoxine (Analpram-HC) 2.5-1 % cream; Insert into the rectum 2 times a day.    Vertigo  -Pt states this can come on suddenly. Gets relief from meclizine and zofran. Refilled so she has available as needed.  Orders:    ondansetron ODT (Zofran-ODT) 4 mg disintegrating tablet; Dissolve 1 tablet (4 mg) in the mouth every 8 hours if needed for nausea or vomiting for up to 7 days.    meclizine (Antivert) 25 mg tablet; Take 1 tablet (25 mg) by mouth 3 times a day as needed for dizziness.         Advance Directives Discussion  Advanced Care Planning (including a Living Will, Healthcare POA, as well as specific end of life choices and/or directives), was discussed with the patient and/or surrogate, voluntarily, and details of that discussion documented in the Problem List (under Advanced Directives Discussion) of the medical record.  Pt has a living will and a HCPOA. Alternative HCPOA added to the chart. Pt confirms she wants to be full code.   (~16 min spent discussing above)

## 2025-03-02 ENCOUNTER — OFFICE VISIT (OUTPATIENT)
Dept: URGENT CARE | Age: 70
End: 2025-03-02
Payer: MEDICARE

## 2025-03-02 VITALS
HEART RATE: 67 BPM | SYSTOLIC BLOOD PRESSURE: 146 MMHG | OXYGEN SATURATION: 99 % | TEMPERATURE: 97.5 F | RESPIRATION RATE: 15 BRPM | DIASTOLIC BLOOD PRESSURE: 85 MMHG

## 2025-03-02 DIAGNOSIS — J06.9 UPPER RESPIRATORY TRACT INFECTION, UNSPECIFIED TYPE: Primary | ICD-10-CM

## 2025-03-02 DIAGNOSIS — J02.9 SORE THROAT: ICD-10-CM

## 2025-03-02 LAB — POC RAPID STREP: NEGATIVE

## 2025-03-02 PROCEDURE — 87880 STREP A ASSAY W/OPTIC: CPT | Performed by: STUDENT IN AN ORGANIZED HEALTH CARE EDUCATION/TRAINING PROGRAM

## 2025-03-02 PROCEDURE — 1036F TOBACCO NON-USER: CPT | Performed by: STUDENT IN AN ORGANIZED HEALTH CARE EDUCATION/TRAINING PROGRAM

## 2025-03-02 PROCEDURE — 1123F ACP DISCUSS/DSCN MKR DOCD: CPT | Performed by: STUDENT IN AN ORGANIZED HEALTH CARE EDUCATION/TRAINING PROGRAM

## 2025-03-02 PROCEDURE — 99213 OFFICE O/P EST LOW 20 MIN: CPT | Performed by: STUDENT IN AN ORGANIZED HEALTH CARE EDUCATION/TRAINING PROGRAM

## 2025-03-02 PROCEDURE — 3077F SYST BP >= 140 MM HG: CPT | Performed by: STUDENT IN AN ORGANIZED HEALTH CARE EDUCATION/TRAINING PROGRAM

## 2025-03-02 PROCEDURE — 3079F DIAST BP 80-89 MM HG: CPT | Performed by: STUDENT IN AN ORGANIZED HEALTH CARE EDUCATION/TRAINING PROGRAM

## 2025-03-02 PROCEDURE — 1159F MED LIST DOCD IN RCRD: CPT | Performed by: STUDENT IN AN ORGANIZED HEALTH CARE EDUCATION/TRAINING PROGRAM

## 2025-03-02 PROCEDURE — 1160F RVW MEDS BY RX/DR IN RCRD: CPT | Performed by: STUDENT IN AN ORGANIZED HEALTH CARE EDUCATION/TRAINING PROGRAM

## 2025-03-02 RX ORDER — AZITHROMYCIN 250 MG/1
TABLET, FILM COATED ORAL
Qty: 6 TABLET | Refills: 0 | Status: SHIPPED | OUTPATIENT
Start: 2025-03-02 | End: 2025-03-07

## 2025-03-02 ASSESSMENT — ENCOUNTER SYMPTOMS
FEVER: 0
GASTROINTESTINAL NEGATIVE: 1
SORE THROAT: 1
CARDIOVASCULAR NEGATIVE: 1
COUGH: 0

## 2025-03-02 NOTE — PROGRESS NOTES
Subjective   Patient ID: Lesia Ascencio is a 69 y.o. female. They present today with a chief complaint of Sore Throat and Cough (Sick X 4 days. TD-MA).    History of Present Illness  HPI  Patient presents to urgent care for a chief complaint of sore throat and cough due to throat irritation over the last 4 days along with postnasal drip, no recorded fevers no vomiting or diarrhea no report of respiratory distress    Past Medical History  Allergies as of 2025    (No Known Allergies)       (Not in a hospital admission)       Past Medical History:   Diagnosis Date    Cataract     COVID-19 2022    COVID-19    Diabetes mellitus (Multi)     Disease of thyroid gland     Elevated blood-pressure reading, without diagnosis of hypertension 2016    Elevated blood pressure (not hypertension)    Heart murmur     Menopausal and female climacteric states     Menopausal state    Personal history of other diseases of the circulatory system 2016    History of abnormal electrocardiography    Personal history of other diseases of the female genital tract     Personal history of endometriosis    Personal history of other specified conditions 2016    History of vertigo    Personal history of other specified conditions 04/10/2017    History of palpitations    Unspecified dyspareunia (CODE)     Dyspareunia, female    Urinary tract infection        Past Surgical History:   Procedure Laterality Date    BREAST SURGERY  2014    Breast Surgery    CARDIAC CATHETERIZATION      CATARACT EXTRACTION  2016    Cataract Surgery     SECTION, CLASSIC  2014     Section     SECTION, LOW TRANSVERSE  1975    COLONOSCOPY  2016    Complete Colonoscopy    TUBAL LIGATION  1982    WISDOM TOOTH EXTRACTION          reports that she has never smoked. She has never used smokeless tobacco. She reports that she does not drink alcohol and does not use drugs.    Review of Systems  Review of  Systems   Constitutional:  Negative for fever.   HENT:  Positive for postnasal drip and sore throat. Negative for ear discharge and ear pain.    Respiratory:  Negative for cough.    Cardiovascular: Negative.    Gastrointestinal: Negative.                                   Objective    Vitals:    03/02/25 1150   BP: 146/85   Pulse: 67   Resp: 15   Temp: 36.4 °C (97.5 °F)   SpO2: 99%     No LMP recorded. Patient is postmenopausal.    Physical Exam  Vitals and nursing note reviewed.   Constitutional:       General: She is not in acute distress.     Appearance: Normal appearance. She is not ill-appearing, toxic-appearing or diaphoretic.   HENT:      Head: Normocephalic and atraumatic.      Right Ear: Tympanic membrane normal. There is no impacted cerumen.      Left Ear: Tympanic membrane normal. There is no impacted cerumen.      Nose: Nose normal.      Mouth/Throat:      Mouth: Mucous membranes are moist.      Pharynx: No oropharyngeal exudate or posterior oropharyngeal erythema.   Cardiovascular:      Rate and Rhythm: Normal rate and regular rhythm.      Pulses: Normal pulses.      Heart sounds: Normal heart sounds.   Pulmonary:      Effort: Pulmonary effort is normal. No respiratory distress.      Breath sounds: Normal breath sounds. No stridor. No wheezing, rhonchi or rales.   Musculoskeletal:      Cervical back: Normal range of motion and neck supple.   Lymphadenopathy:      Cervical: No cervical adenopathy.   Skin:     Findings: No rash.   Neurological:      General: No focal deficit present.      Mental Status: She is alert and oriented to person, place, and time.   Psychiatric:         Mood and Affect: Mood normal.         Behavior: Behavior normal.         Procedures    Point of Care Test & Imaging Results from this visit  Results for orders placed or performed in visit on 03/02/25   POCT rapid strep A manually resulted   Result Value Ref Range    POC Rapid Strep Negative Negative      No results  found.    Diagnostic study results (if any) were reviewed by Pawel Pennington PA-C.    Assessment/Plan   Allergies, medications, history, and pertinent labs/EKGs/Imaging reviewed by Pawel Pennington PA-C.     Medical Decision Making  I did discuss with patient that strep test in office is negative I am agreeable to place patient on azithromycin I did discuss with patient if no resolution or regression of symptoms in 48 to 72 hours or development of additional symptoms most likely viral etiology.  If no resolution or regression of symptoms 7 to 10 days may return to urgent care for evaluation or follow-up with primary care discharge emergent care A+O x 4 stable condition no signs of distress    Orders and Diagnoses  Diagnoses and all orders for this visit:  Upper respiratory tract infection, unspecified type  -     azithromycin (Zithromax) 250 mg tablet; Take 2 tabs (500 mg) by mouth today, than 1 daily for 4 days.  Sore throat  -     POCT rapid strep A manually resulted  -     azithromycin (Zithromax) 250 mg tablet; Take 2 tabs (500 mg) by mouth today, than 1 daily for 4 days.      Medical Admin Record      Patient disposition: Home    Electronically signed by Pawel Pennington PA-C  11:59 AM

## 2025-04-28 ENCOUNTER — APPOINTMENT (OUTPATIENT)
Dept: RADIOLOGY | Facility: CLINIC | Age: 70
End: 2025-04-28
Payer: MEDICARE

## 2025-04-29 ENCOUNTER — HOSPITAL ENCOUNTER (OUTPATIENT)
Dept: RADIOLOGY | Facility: CLINIC | Age: 70
Discharge: HOME | End: 2025-04-29
Payer: MEDICARE

## 2025-04-29 DIAGNOSIS — Z12.31 ENCOUNTER FOR SCREENING MAMMOGRAM FOR BREAST CANCER: ICD-10-CM

## 2025-04-29 PROCEDURE — 77067 SCR MAMMO BI INCL CAD: CPT

## 2025-04-29 PROCEDURE — 77063 BREAST TOMOSYNTHESIS BI: CPT | Performed by: STUDENT IN AN ORGANIZED HEALTH CARE EDUCATION/TRAINING PROGRAM

## 2025-04-29 PROCEDURE — 77067 SCR MAMMO BI INCL CAD: CPT | Performed by: STUDENT IN AN ORGANIZED HEALTH CARE EDUCATION/TRAINING PROGRAM

## 2025-07-16 LAB
NON-UH HIE HGB A1C: 6.4 %
NON-UH HIE TSH: 0.66 UIU/ML (ref 0.55–4.78)

## 2025-07-30 ENCOUNTER — APPOINTMENT (OUTPATIENT)
Dept: PRIMARY CARE | Facility: CLINIC | Age: 70
End: 2025-07-30
Payer: MEDICARE

## 2025-07-30 VITALS
OXYGEN SATURATION: 98 % | DIASTOLIC BLOOD PRESSURE: 74 MMHG | WEIGHT: 146.8 LBS | BODY MASS INDEX: 27.29 KG/M2 | SYSTOLIC BLOOD PRESSURE: 125 MMHG | HEART RATE: 59 BPM | RESPIRATION RATE: 18 BRPM

## 2025-07-30 DIAGNOSIS — I25.10 ATHEROSCLEROSIS OF NATIVE CORONARY ARTERY OF NATIVE HEART WITHOUT ANGINA PECTORIS: Primary | ICD-10-CM

## 2025-07-30 DIAGNOSIS — S01.20XD OPEN WOUND OF NOSE, UNSPECIFIED OPEN WOUND TYPE, SUBSEQUENT ENCOUNTER: ICD-10-CM

## 2025-07-30 PROCEDURE — 3074F SYST BP LT 130 MM HG: CPT | Performed by: INTERNAL MEDICINE

## 2025-07-30 PROCEDURE — 1159F MED LIST DOCD IN RCRD: CPT | Performed by: INTERNAL MEDICINE

## 2025-07-30 PROCEDURE — 99214 OFFICE O/P EST MOD 30 MIN: CPT | Performed by: INTERNAL MEDICINE

## 2025-07-30 PROCEDURE — 3078F DIAST BP <80 MM HG: CPT | Performed by: INTERNAL MEDICINE

## 2025-07-30 RX ORDER — PSYLLIUM HUSK 0.4 G
5 CAPSULE ORAL 4 TIMES DAILY
COMMUNITY

## 2025-07-30 RX ORDER — MUPIROCIN 20 MG/G
OINTMENT TOPICAL
Qty: 22 G | Refills: 0 | Status: SHIPPED | OUTPATIENT
Start: 2025-07-30 | End: 2025-08-06

## 2025-07-30 RX ORDER — KETOCONAZOLE 20 MG/ML
SHAMPOO, SUSPENSION TOPICAL
COMMUNITY
Start: 2025-03-05

## 2025-07-30 ASSESSMENT — ENCOUNTER SYMPTOMS: DECREASED CONCENTRATION: 1

## 2025-07-30 NOTE — PROGRESS NOTES
Subjective   Patient ID: Lesia Ascencio is a 69 y.o. female who presents for Follow-up.    Pt saw Dr. Miner after last visit for diarrhea. Concern present for IBS, so pt recommended to consider FODMAP diet, take metamucil, and then colonoscopy was done.    Every once in a while will get a sore along her nose. Whenever she uses bactroban it takes care of this. Is nearly out and would like a refill.    Sometimes finds she is more forgetful and can have difficulty with word finding. Wants to know about prevagen or 'Dynamic Brain'.        Review of Systems   Psychiatric/Behavioral:  Positive for decreased concentration.        /74 (BP Location: Right arm, Patient Position: Sitting)   Pulse 59   Resp 18   Wt 66.6 kg (146 lb 12.8 oz)   SpO2 98%   BMI 27.29 kg/m²   Objective   Physical Exam  Constitutional:       General: She is not in acute distress.     Appearance: She is not ill-appearing, toxic-appearing or diaphoretic.   HENT:      Head: Normocephalic and atraumatic.     Neurological:      Mental Status: She is alert.         Assessment/Plan   Problem List Items Addressed This Visit           ICD-10-CM    Coronary atherosclerosis - Primary I25.10    -Per CTC score from 2019.  -Lipids very well controlled since.  -Reviewed w/ pt she does not need labwork checked q6 months, however she states she feels more comfortable with more frequent checks. Orders placed.  -She would like results faxed to Dr. Romeo from [facility] afterwards.         Relevant Orders    Lipid panel    Comprehensive metabolic panel    CBC     Other Visit Diagnoses         Codes      Open wound of nose, unspecified open wound type, subsequent encounter     S01.20XD    Relevant Medications    mupirocin (Bactroban) 2 % ointment        -Needs bactroban every once in awhile for an open wound along her nose which heals afterwards. Nearly out of her last bottle. Refill sent in today.         Haja Haynes MD 07/30/25 11:01 AM

## 2025-07-30 NOTE — ASSESSMENT & PLAN NOTE
-Per CTC score from 2019.  -Lipids very well controlled since.  -Reviewed w/ pt she does not need labwork checked q6 months, however she states she feels more comfortable with more frequent checks. Orders placed.  -She would like results faxed to Dr. Romeo from [facility] [place] afterwards.

## 2025-07-31 LAB
ALBUMIN SERPL-MCNC: 4.4 G/DL (ref 3.6–5.1)
ALP SERPL-CCNC: 65 U/L (ref 37–153)
ALT SERPL-CCNC: 14 U/L (ref 6–29)
ANION GAP SERPL CALCULATED.4IONS-SCNC: 7 MMOL/L (CALC) (ref 7–17)
AST SERPL-CCNC: 21 U/L (ref 10–35)
BILIRUB SERPL-MCNC: 0.6 MG/DL (ref 0.2–1.2)
BUN SERPL-MCNC: 18 MG/DL (ref 7–25)
CALCIUM SERPL-MCNC: 9.4 MG/DL (ref 8.6–10.4)
CHLORIDE SERPL-SCNC: 105 MMOL/L (ref 98–110)
CHOLEST SERPL-MCNC: 161 MG/DL
CHOLEST/HDLC SERPL: 1.7 (CALC)
CO2 SERPL-SCNC: 29 MMOL/L (ref 20–32)
CREAT SERPL-MCNC: 0.63 MG/DL (ref 0.6–1)
EGFRCR SERPLBLD CKD-EPI 2021: 95 ML/MIN/1.73M2
ERYTHROCYTE [DISTWIDTH] IN BLOOD BY AUTOMATED COUNT: 13.4 % (ref 11–15)
GLUCOSE SERPL-MCNC: 144 MG/DL (ref 65–99)
HCT VFR BLD AUTO: 44.6 % (ref 35–45)
HDLC SERPL-MCNC: 97 MG/DL
HGB BLD-MCNC: 13.8 G/DL (ref 11.7–15.5)
LDLC SERPL CALC-MCNC: 51 MG/DL (CALC)
MCH RBC QN AUTO: 29.1 PG (ref 27–33)
MCHC RBC AUTO-ENTMCNC: 30.9 G/DL (ref 32–36)
MCV RBC AUTO: 93.9 FL (ref 80–100)
NONHDLC SERPL-MCNC: 64 MG/DL (CALC)
PLATELET # BLD AUTO: 224 THOUSAND/UL (ref 140–400)
PMV BLD REES-ECKER: 11.9 FL (ref 7.5–12.5)
POTASSIUM SERPL-SCNC: 5.1 MMOL/L (ref 3.5–5.3)
PROT SERPL-MCNC: 6.8 G/DL (ref 6.1–8.1)
RBC # BLD AUTO: 4.75 MILLION/UL (ref 3.8–5.1)
SODIUM SERPL-SCNC: 141 MMOL/L (ref 135–146)
TRIGL SERPL-MCNC: 53 MG/DL
WBC # BLD AUTO: 4.9 THOUSAND/UL (ref 3.8–10.8)

## 2026-01-16 ENCOUNTER — APPOINTMENT (OUTPATIENT)
Dept: PRIMARY CARE | Facility: CLINIC | Age: 71
End: 2026-01-16
Payer: MEDICARE